# Patient Record
Sex: FEMALE | Race: BLACK OR AFRICAN AMERICAN | NOT HISPANIC OR LATINO | Employment: UNEMPLOYED | URBAN - METROPOLITAN AREA
[De-identification: names, ages, dates, MRNs, and addresses within clinical notes are randomized per-mention and may not be internally consistent; named-entity substitution may affect disease eponyms.]

---

## 2017-01-02 ENCOUNTER — ANESTHESIA EVENT (OUTPATIENT)
Dept: OBSTETRICS AND GYNECOLOGY | Facility: HOSPITAL | Age: 32
End: 2017-01-02
Payer: MEDICAID

## 2017-01-02 ENCOUNTER — HOSPITAL ENCOUNTER (INPATIENT)
Facility: HOSPITAL | Age: 32
LOS: 2 days | Discharge: HOME | End: 2017-01-04
Attending: OBSTETRICS & GYNECOLOGY | Admitting: OBSTETRICS & GYNECOLOGY
Payer: MEDICAID

## 2017-01-02 ENCOUNTER — ANESTHESIA (OUTPATIENT)
Dept: OBSTETRICS AND GYNECOLOGY | Facility: HOSPITAL | Age: 32
End: 2017-01-02
Payer: MEDICAID

## 2017-01-02 VITALS — DIASTOLIC BLOOD PRESSURE: 94 MMHG | OXYGEN SATURATION: 100 % | SYSTOLIC BLOOD PRESSURE: 110 MMHG

## 2017-01-02 DIAGNOSIS — O60.00: ICD-10-CM

## 2017-01-02 LAB
ABO + RH BLD: NORMAL
AMPHET+METHAMPHET UR QL: NEGATIVE
BARBITURATES UR QL SCN>200 NG/ML: NEGATIVE
BASOPHILS # BLD AUTO: 0.01 K/UL
BASOPHILS NFR BLD: 0.1 %
BENZODIAZ UR QL SCN>200 NG/ML: NEGATIVE
BILIRUB UR QL STRIP: NEGATIVE
BLD GP AB SCN CELLS X3 SERPL QL: NORMAL
BZE UR QL SCN: NEGATIVE
CANNABINOIDS UR QL SCN: NORMAL
CLARITY UR: CLEAR
COLOR UR: YELLOW
CREAT UR-MCNC: 35.8 MG/DL
DIFFERENTIAL METHOD: ABNORMAL
EOSINOPHIL # BLD AUTO: 0 K/UL
EOSINOPHIL NFR BLD: 0.4 %
ERYTHROCYTE [DISTWIDTH] IN BLOOD BY AUTOMATED COUNT: 15.2 %
GLUCOSE UR QL STRIP: NEGATIVE
HCT VFR BLD AUTO: 32.5 %
HGB BLD-MCNC: 10.7 G/DL
HGB S BLD QL SOLY: NEGATIVE
HGB UR QL STRIP: NEGATIVE
HIV1+2 IGG SERPL QL IA.RAPID: NEGATIVE
KETONES UR QL STRIP: ABNORMAL
LEUKOCYTE ESTERASE UR QL STRIP: NEGATIVE
LYMPHOCYTES # BLD AUTO: 2.9 K/UL
LYMPHOCYTES NFR BLD: 26.5 %
MCH RBC QN AUTO: 26.6 PG
MCHC RBC AUTO-ENTMCNC: 32.9 %
MCV RBC AUTO: 81 FL
METHADONE UR QL SCN>300 NG/ML: NEGATIVE
MONOCYTES # BLD AUTO: 0.6 K/UL
MONOCYTES NFR BLD: 5.5 %
NEUTROPHILS # BLD AUTO: 7.3 K/UL
NEUTROPHILS NFR BLD: 67.5 %
NITRITE UR QL STRIP: NEGATIVE
OPIATES UR QL SCN: NEGATIVE
PCP UR QL SCN>25 NG/ML: NEGATIVE
PH UR STRIP: 6 [PH] (ref 5–8)
PLATELET # BLD AUTO: 266 K/UL
PMV BLD AUTO: 10.4 FL
PROT UR QL STRIP: NEGATIVE
RBC # BLD AUTO: 4.02 M/UL
RPR SER QL: NORMAL
SP GR UR STRIP: <=1.005 (ref 1–1.03)
TOXICOLOGY INFORMATION: NORMAL
URN SPEC COLLECT METH UR: ABNORMAL
UROBILINOGEN UR STRIP-ACNC: NEGATIVE EU/DL
WBC # BLD AUTO: 10.86 K/UL

## 2017-01-02 PROCEDURE — 86701 HIV-1ANTIBODY: CPT

## 2017-01-02 PROCEDURE — 63600175 PHARM REV CODE 636 W HCPCS: Performed by: NURSE ANESTHETIST, CERTIFIED REGISTERED

## 2017-01-02 PROCEDURE — 87081 CULTURE SCREEN ONLY: CPT

## 2017-01-02 PROCEDURE — 87340 HEPATITIS B SURFACE AG IA: CPT

## 2017-01-02 PROCEDURE — 63600175 PHARM REV CODE 636 W HCPCS: Performed by: ADVANCED PRACTICE MIDWIFE

## 2017-01-02 PROCEDURE — 85660 RBC SICKLE CELL TEST: CPT

## 2017-01-02 PROCEDURE — 11000001 HC ACUTE MED/SURG PRIVATE ROOM

## 2017-01-02 PROCEDURE — 86901 BLOOD TYPING SEROLOGIC RH(D): CPT

## 2017-01-02 PROCEDURE — 86900 BLOOD TYPING SEROLOGIC ABO: CPT

## 2017-01-02 PROCEDURE — 99211 OFF/OP EST MAY X REQ PHY/QHP: CPT | Mod: 25

## 2017-01-02 PROCEDURE — 0HQ9XZZ REPAIR PERINEUM SKIN, EXTERNAL APPROACH: ICD-10-PCS | Performed by: ADVANCED PRACTICE MIDWIFE

## 2017-01-02 PROCEDURE — 87591 N.GONORRHOEAE DNA AMP PROB: CPT

## 2017-01-02 PROCEDURE — 86592 SYPHILIS TEST NON-TREP QUAL: CPT

## 2017-01-02 PROCEDURE — 72200004 HC VAGINAL DELIVERY LEVEL I

## 2017-01-02 PROCEDURE — 25000003 PHARM REV CODE 250: Performed by: ADVANCED PRACTICE MIDWIFE

## 2017-01-02 PROCEDURE — 62326 NJX INTERLAMINAR LMBR/SAC: CPT

## 2017-01-02 PROCEDURE — 85025 COMPLETE CBC W/AUTO DIFF WBC: CPT

## 2017-01-02 PROCEDURE — 51701 INSERT BLADDER CATHETER: CPT

## 2017-01-02 PROCEDURE — 27800517 HC TRAY,EPIDURAL-CONTINUOUS: Performed by: NURSE ANESTHETIST, CERTIFIED REGISTERED

## 2017-01-02 PROCEDURE — 72100002 HC LABOR CARE, 1ST 8 HOURS

## 2017-01-02 PROCEDURE — 82570 ASSAY OF URINE CREATININE: CPT

## 2017-01-02 PROCEDURE — 81003 URINALYSIS AUTO W/O SCOPE: CPT

## 2017-01-02 PROCEDURE — 25000003 PHARM REV CODE 250: Performed by: NURSE ANESTHETIST, CERTIFIED REGISTERED

## 2017-01-02 PROCEDURE — 86762 RUBELLA ANTIBODY: CPT

## 2017-01-02 RX ORDER — MAGNESIUM SULFATE HEPTAHYDRATE 40 MG/ML
2 INJECTION, SOLUTION INTRAVENOUS ONCE
Status: COMPLETED | OUTPATIENT
Start: 2017-01-02 | End: 2017-01-02

## 2017-01-02 RX ORDER — BETAMETHASONE SODIUM PHOSPHATE AND BETAMETHASONE ACETATE 3; 3 MG/ML; MG/ML
12 INJECTION, SUSPENSION INTRA-ARTICULAR; INTRALESIONAL; INTRAMUSCULAR; SOFT TISSUE EVERY 24 HOURS
Status: DISCONTINUED | OUTPATIENT
Start: 2017-01-03 | End: 2017-01-03

## 2017-01-02 RX ORDER — SODIUM CITRATE AND CITRIC ACID MONOHYDRATE 334; 500 MG/5ML; MG/5ML
30 SOLUTION ORAL ONCE
Status: DISCONTINUED | OUTPATIENT
Start: 2017-01-02 | End: 2017-01-03

## 2017-01-02 RX ORDER — CALCIUM GLUCONATE 98 MG/ML
1 INJECTION, SOLUTION INTRAVENOUS
Status: DISCONTINUED | OUTPATIENT
Start: 2017-01-02 | End: 2017-01-03

## 2017-01-02 RX ORDER — ROPIVACAINE HYDROCHLORIDE 2 MG/ML
INJECTION, SOLUTION EPIDURAL; INFILTRATION; PERINEURAL
Status: DISCONTINUED | OUTPATIENT
Start: 2017-01-02 | End: 2017-01-03

## 2017-01-02 RX ORDER — ROPIVACAINE IN 0.9% SOD CHL/PF 0.2 %
PLASTIC BAG, INJECTION (ML) EPIDURAL CONTINUOUS
Status: DISCONTINUED | OUTPATIENT
Start: 2017-01-02 | End: 2017-01-03

## 2017-01-02 RX ORDER — ROPIVACAINE HYDROCHLORIDE 2 MG/ML
INJECTION, SOLUTION EPIDURAL; INFILTRATION; PERINEURAL CONTINUOUS PRN
Status: DISCONTINUED | OUTPATIENT
Start: 2017-01-02 | End: 2017-01-03

## 2017-01-02 RX ORDER — FAMOTIDINE 10 MG/ML
20 INJECTION INTRAVENOUS ONCE
Status: DISCONTINUED | OUTPATIENT
Start: 2017-01-02 | End: 2017-01-03

## 2017-01-02 RX ORDER — MAGNESIUM SULFATE HEPTAHYDRATE 40 MG/ML
2 INJECTION, SOLUTION INTRAVENOUS CONTINUOUS
Status: DISCONTINUED | OUTPATIENT
Start: 2017-01-02 | End: 2017-01-03

## 2017-01-02 RX ORDER — LIDOCAINE HYDROCHLORIDE AND EPINEPHRINE 15; 5 MG/ML; UG/ML
INJECTION, SOLUTION EPIDURAL
Status: DISCONTINUED | OUTPATIENT
Start: 2017-01-02 | End: 2017-01-03

## 2017-01-02 RX ORDER — ONDANSETRON 8 MG/1
8 TABLET, ORALLY DISINTEGRATING ORAL EVERY 8 HOURS PRN
Status: DISCONTINUED | OUTPATIENT
Start: 2017-01-02 | End: 2017-01-03

## 2017-01-02 RX ORDER — METOCLOPRAMIDE HYDROCHLORIDE 5 MG/ML
10 INJECTION INTRAMUSCULAR; INTRAVENOUS ONCE
Status: DISCONTINUED | OUTPATIENT
Start: 2017-01-02 | End: 2017-01-03

## 2017-01-02 RX ORDER — ACETAMINOPHEN 500 MG
500 TABLET ORAL EVERY 6 HOURS PRN
Status: DISCONTINUED | OUTPATIENT
Start: 2017-01-02 | End: 2017-01-03

## 2017-01-02 RX ORDER — SODIUM CHLORIDE, SODIUM LACTATE, POTASSIUM CHLORIDE, CALCIUM CHLORIDE 600; 310; 30; 20 MG/100ML; MG/100ML; MG/100ML; MG/100ML
INJECTION, SOLUTION INTRAVENOUS CONTINUOUS
Status: DISCONTINUED | OUTPATIENT
Start: 2017-01-02 | End: 2017-01-03

## 2017-01-02 RX ADMIN — LIDOCAINE HYDROCHLORIDE AND EPINEPHRINE 3 ML: 15; 5 INJECTION, SOLUTION EPIDURAL; INFILTRATION; INTRACAUDAL; PERINEURAL at 08:01

## 2017-01-02 RX ADMIN — ONDANSETRON 8 MG: 8 TABLET, ORALLY DISINTEGRATING ORAL at 09:01

## 2017-01-02 RX ADMIN — ROPIVACAINE HYDROCHLORIDE 8 ML: 2 INJECTION, SOLUTION EPIDURAL; INFILTRATION; PERINEURAL at 08:01

## 2017-01-02 RX ADMIN — AMPICILLIN SODIUM 2 G: 2 INJECTION, POWDER, FOR SOLUTION INTRAMUSCULAR; INTRAVENOUS at 07:01

## 2017-01-02 RX ADMIN — SODIUM CHLORIDE, SODIUM LACTATE, POTASSIUM CHLORIDE, AND CALCIUM CHLORIDE 1000 ML: .6; .31; .03; .02 INJECTION, SOLUTION INTRAVENOUS at 07:01

## 2017-01-02 RX ADMIN — SODIUM CHLORIDE, SODIUM LACTATE, POTASSIUM CHLORIDE, AND CALCIUM CHLORIDE: .6; .31; .03; .02 INJECTION, SOLUTION INTRAVENOUS at 09:01

## 2017-01-02 RX ADMIN — ROPIVACAINE HYDROCHLORIDE 14 ML/HR: 2 INJECTION, SOLUTION EPIDURAL; INFILTRATION at 08:01

## 2017-01-02 RX ADMIN — ROPIVACAINE HYDROCHLORIDE 7 ML: 2 INJECTION, SOLUTION EPIDURAL; INFILTRATION; PERINEURAL at 08:01

## 2017-01-02 RX ADMIN — MAGNESIUM SULFATE IN WATER 2 G/HR: 40 INJECTION, SOLUTION INTRAVENOUS at 09:01

## 2017-01-02 RX ADMIN — MAGNESIUM SULFATE IN WATER 2 G: 40 INJECTION, SOLUTION INTRAVENOUS at 08:01

## 2017-01-02 RX ADMIN — Medication 333 MILLI-UNITS/MIN: at 11:01

## 2017-01-02 RX ADMIN — BETAMETHASONE SODIUM PHOSPHATE AND BETAMETHASONE ACETATE 12 MG: 3; 3 INJECTION, SUSPENSION INTRA-ARTICULAR; INTRALESIONAL; INTRAMUSCULAR at 08:01

## 2017-01-02 NOTE — IP AVS SNAPSHOT
[address] [place]nie [place] [address]           Patient Discharge Instructions     Our goal is to set you up for success. This packet includes information on your condition, medications, and your home care. It will help you to care for yourself so you don't get sicker and need to go back to the hospital.     Please ask your nurse if you have any questions.        There are many details to remember when preparing to leave the hospital. Here is what you will need to do:    1. Take your medicine. If you are prescribed medications, review your Medication List in the following pages. You may have new medications to  at the pharmacy and others that you'll need to stop taking. Review the instructions for how and when to take your medications. Talk with your doctor or nurses if you are unsure of what to do.     2. Go to your follow-up appointments. Specific follow-up information is listed in the following pages. Your may be contacted by a transition nurse or clinical provider about future appointments. Be sure we have all of the phone numbers to reach you, if needed. Please contact your provider's office if you are unable to make an appointment.     3. Watch for warning signs. Your doctor or nurse will give you detailed warning signs to watch for and when to call for assistance. These instructions may also include educational information about your condition. If you experience any of warning signs to your health, call your doctor.               ** Verify the list of medication(s) below is accurate and up to date. Carry this with you in case of emergency. If your medications have changed, please notify your healthcare provider.             Medication List      START taking these medications        Additional Info                      ibuprofen 600 MG tablet   Commonly known as:  ADVIL,MOTRIN   Quantity:  30 tablet   Refills:  0   Dose:  600 mg    Last time this was given:  600  mg on 1/4/2017 12:04 PM   Instructions:  Take 1 tablet (600 mg total) by mouth every 6 (six) hours.     Begin Date    AM    Noon    PM    Bedtime         CONTINUE taking these medications        Additional Info                      diclofenac 50 MG EC tablet   Commonly known as:  VOLTAREN   Quantity:  15 tablet   Refills:  0   Dose:  50 mg    Instructions:  Take 1 tablet (50 mg total) by mouth 3 (three) times daily as needed (PAIN).     Begin Date    AM    Noon    PM    Bedtime         STOP taking these medications     cyclobenzaprine 10 MG tablet   Commonly known as:  FLEXERIL            Where to Get Your Medications      You can get these medications from any pharmacy     Bring a paper prescription for each of these medications     ibuprofen 600 MG tablet                  Please bring to all follow up appointments:    1. A copy of your discharge instructions.  2. All medicines you are currently taking in their original bottles.  3. Identification and insurance card.    Please arrive 15 minutes ahead of scheduled appointment time.    Please call 24 hours in advance if you must reschedule your appointment and/or time.        Your Scheduled Appointments     Jan 19, 2017 10:00 AM CST   Post Partum with Will Pahceco MD   [facility] ([facility]    [address] [place] [id number]   [phone]            Feb 01, 2017  1:30 PM CST   Post Partum with Laura Ramirez CNM   [facility] - [facility] ([facility])    [address] [place] [id number]   [phone]              Follow-up Information     Follow up with Laura Ramirez CNM. Go on 2/1/2017.    Specialty:  Obstetrics and Gynecology    Why:  postpartum follow up    Contact information:    [address]  [place] [address]  [phone]          Follow up with Will Pacheco MD. Go on 1/19/2017.    Specialties:  Obstetrics, Obstetrics and Gynecology    Why:  tubal consult    Contact information:    [address]  [place]  "[id number] [phone]            Discharge Instructions       Mother Self Care:    Activity: Avoid strenuous exercise and get adequate rest.  No driving until the physician consent given.  Emotional Changes: Most women find birth to be a time of great emotional upheaval.  Sense of loss, mood swings, fatigue, anxiety, and feeling "let down" are common.  If feelings worsen or last more than a week, call your physician.  Breast Care/Breastfeeding: Wear a bra for comfort.  Keep nipples dry and apply your own breast milk or lanolin cream as needed for soreness.  Engorgement can be relieved with warm, moist heat before feedings.  You may also take Ibuprofen.  Breast Care/Bottle Feeding: Wear support bra 24 hours a day for one week.  Avoid stimulation to breasts.  You may use ice packs for discomfort.  Mariam-Care/Vaginal Bleeding: Remember to use your mariam-bottle after urinating.  Your flow will change from red, to pink, to yellow/white color over a period of 2 weeks.  Menstruation will return in 3-8 weeks, or longer if breastfeeding.  Episiotomy Vaginal Delivery: Stitches will dissolve within 10 days to 3 weeks.  Warm baths, tucks, and dermoplast will promote healing.  Avoid bubble baths or strong soaps.   Section/Tubal Ligation: Keep incision clean and dry.  Please remove steri-strips in 5-7 days.  You may shower, but avoid baths.  Sexual Activity/Pelvic Rest: No sexual activity, tampons, or douching until your physician gives you consent.  Diet: Continue to eat from the five basic food groups, including plenty of protein, fruits, vegetables, and whole grains.  Limit empty calories and high fat foods.  Drink enough fluids to satisfy thirst and add an extra 500 calories for breastfeeding.  Constipation/Hemorrhoids: Drink plenty of water.  You may take a stool softener or natural laxative (Metamucil). You may use tucks or hemorrhoid ointment and soak in a warm tub.    CALL YOUR OB DOCTOR IF ANY OF THE FOLLOWING " OCCURS:  *Heavy bleeding - saturating a pad an hour or passing any large (2-3 inches in size) blood clots.  *Any pain, redness, or tenderness in lower leg.  *You cannot care for yourself or your baby.  *Any signs of infection-      - Temperature greater than 100.5 degrees F      - Foul smelling vaginal discharge and/or incisional drainage      - Increased episiotomy or incisional pain      - Hot, hard, red or sore area on breast      - Flu-like symptoms      - Any urgency, frequency or burning with urination    Discharge References/Attachments     VAGINAL BIRTH, INCISION CARE AFTER (ENGLISH)    AFTER DELIVERY: WHEN TO CALL THE HEALTH CARE PROVIDER (ENGLISH)        Primary Diagnosis     Your primary diagnosis was:   Labor      Admission Information     Date & Time Provider Department CSN    2017  5:26 PM Parth Foster MD [facility] [id number]      Care Providers     Provider Role Specialty Primary office phone    Parth Foster MD Attending Provider Obstetrics and Gynecology [phone]      Your Vitals Were     BP Pulse Temp Resp Last Period SpO2    122/76 56 98.6 °F (37 °C) (Oral) 18 2016 94%      Recent Lab Values     No lab values to display.      Pending Labs     Order Current Status    Rubella antibody, IgM In process    Strep B Screen, Vaginal / Rectal Preliminary result      Allergies as of 2017     No Known Allergies      [facility]     [facility] Nurse Care Line - 24/ Assistance  Unless otherwise directed by your provider, please contact [facility], our nurse care line that is available for / assistance.     Registered nurses in the [facility] provide clinical advisement, health education, appointment booking, and other advisory services.  Call for this free service at [phone].        Advance Directives     An advance directive is a document which, in the event you are no longer able to make decisions for yourself, tells your  healthcare team what kind of treatment you do or do not want to receive, or who you would like to make those decisions for you.  If you do not currently have an advance directive, [facility] encourages you to create one.  For more information call:  [phone]), [phone] ([phone]),  or log on to [email or web address].        Smoking Cessation     If you would like to quit smoking:   You may be eligible for free services if you are a [place] resident and started smoking cigarettes before September 1, 1988.  Call the Smoking Cessation Trust (SCT) toll free at (765) 296-3074 or (492) 473-3667.   Call 3-064-QUIT-NOW if you do not meet the above criteria.            Language Assistance Services     ATTENTION: Language assistance services are available, free of charge. Please call [phone].      ATENCIÓN: Si habla español, tiene a pike disposición servicios gratuitos de asistencia lingüística. Llame al [phone].     [facility] Ý: N?u b?n nói Ti?ng Vi?t, có các d?ch v? h? tr? ngôn ng? mi?n phí dành cho b?n. G?i s? [phone].        [facility] Sign-Up     Activating your [facility] account is as easy as 1-2-3!     1) Visit [email or web address], select Sign Up Now, enter this activation code and your date of birth, then select Next.  [email or web address]  Expires: 2/18/2017  5:58 PM      2) Create a username and password to use when you visit [facility] in the future and select a security question in case you lose your password and select Next.    3) Enter your e-mail address and click Sign Up!    Additional Information  If you have questions, please e-mail [email or web address] or call [phone] to talk to our [facility] staff. Remember, [facility] is NOT to be used for urgent needs. For medical emergencies, dial 911.          [facility]  complies with applicable Federal civil rights laws and does not discriminate on the basis of race, color, national origin, age, disability, or  sex.

## 2017-01-02 NOTE — IP AVS SNAPSHOT
[facility]  [id number] [place] [address] Dr [place] [address]           I have received a copy of my After Visit Summary and discharge instructions from [facility].    INSTRUCTIONS RECEIVED AND UNDERSTOOD BY:                     Patient/Patient Representative: ________________________________________________________________     Date/Time: ________________________________________________________________                     Instructions Given By: ________________________________________________________________     Date/Time: ________________________________________________________________

## 2017-01-03 PROBLEM — O60.00 PREMATURE LABOR AFTER 22 WEEKS, BUT BEFORE 37 COMPLETED WEEKS OF GESTATION WITHOUT DELIVERY: Status: RESOLVED | Noted: 2017-01-02 | Resolved: 2017-01-03

## 2017-01-03 PROCEDURE — 51701 INSERT BLADDER CATHETER: CPT

## 2017-01-03 PROCEDURE — 72100002 HC LABOR CARE, 1ST 8 HOURS

## 2017-01-03 PROCEDURE — 25000003 PHARM REV CODE 250: Performed by: MIDWIFE

## 2017-01-03 PROCEDURE — 11000001 HC ACUTE MED/SURG PRIVATE ROOM

## 2017-01-03 PROCEDURE — 72200004 HC VAGINAL DELIVERY LEVEL I

## 2017-01-03 PROCEDURE — 25000003 PHARM REV CODE 250: Performed by: ADVANCED PRACTICE MIDWIFE

## 2017-01-03 PROCEDURE — 63600175 PHARM REV CODE 636 W HCPCS: Performed by: ADVANCED PRACTICE MIDWIFE

## 2017-01-03 PROCEDURE — 59409 OBSTETRICAL CARE: CPT | Mod: AT,,, | Performed by: ADVANCED PRACTICE MIDWIFE

## 2017-01-03 PROCEDURE — 99211 OFF/OP EST MAY X REQ PHY/QHP: CPT

## 2017-01-03 RX ORDER — DIPHENHYDRAMINE HYDROCHLORIDE 50 MG/ML
25 INJECTION INTRAMUSCULAR; INTRAVENOUS EVERY 4 HOURS PRN
Status: DISCONTINUED | OUTPATIENT
Start: 2017-01-03 | End: 2017-01-04 | Stop reason: HOSPADM

## 2017-01-03 RX ORDER — OXYTOCIN/RINGER'S LACTATE 20/1000 ML
41.65 PLASTIC BAG, INJECTION (ML) INTRAVENOUS CONTINUOUS
Status: ACTIVE | OUTPATIENT
Start: 2017-01-03 | End: 2017-01-03

## 2017-01-03 RX ORDER — IBUPROFEN 600 MG/1
600 TABLET ORAL EVERY 6 HOURS
Status: DISCONTINUED | OUTPATIENT
Start: 2017-01-03 | End: 2017-01-04 | Stop reason: HOSPADM

## 2017-01-03 RX ORDER — ACETAMINOPHEN 325 MG/1
650 TABLET ORAL EVERY 6 HOURS PRN
Status: DISCONTINUED | OUTPATIENT
Start: 2017-01-03 | End: 2017-01-04 | Stop reason: HOSPADM

## 2017-01-03 RX ORDER — ONDANSETRON 8 MG/1
8 TABLET, ORALLY DISINTEGRATING ORAL EVERY 8 HOURS PRN
Status: DISCONTINUED | OUTPATIENT
Start: 2017-01-03 | End: 2017-01-04 | Stop reason: HOSPADM

## 2017-01-03 RX ORDER — MAG HYDROX/ALUMINUM HYD/SIMETH 200-200-20
30 SUSPENSION, ORAL (FINAL DOSE FORM) ORAL EVERY 6 HOURS PRN
Status: DISCONTINUED | OUTPATIENT
Start: 2017-01-03 | End: 2017-01-04 | Stop reason: HOSPADM

## 2017-01-03 RX ORDER — OXYCODONE AND ACETAMINOPHEN 10; 325 MG/1; MG/1
1 TABLET ORAL ONCE
Status: COMPLETED | OUTPATIENT
Start: 2017-01-03 | End: 2017-01-03

## 2017-01-03 RX ORDER — OXYCODONE AND ACETAMINOPHEN 5; 325 MG/1; MG/1
1 TABLET ORAL EVERY 4 HOURS PRN
Status: DISCONTINUED | OUTPATIENT
Start: 2017-01-03 | End: 2017-01-04 | Stop reason: HOSPADM

## 2017-01-03 RX ORDER — DIPHENHYDRAMINE HCL 25 MG
25 CAPSULE ORAL EVERY 4 HOURS PRN
Status: DISCONTINUED | OUTPATIENT
Start: 2017-01-03 | End: 2017-01-04 | Stop reason: HOSPADM

## 2017-01-03 RX ORDER — DOCUSATE SODIUM 100 MG/1
200 CAPSULE, LIQUID FILLED ORAL 2 TIMES DAILY PRN
Status: DISCONTINUED | OUTPATIENT
Start: 2017-01-03 | End: 2017-01-04 | Stop reason: HOSPADM

## 2017-01-03 RX ORDER — OXYCODONE AND ACETAMINOPHEN 10; 325 MG/1; MG/1
1 TABLET ORAL EVERY 4 HOURS PRN
Status: DISCONTINUED | OUTPATIENT
Start: 2017-01-03 | End: 2017-01-03

## 2017-01-03 RX ORDER — OXYCODONE AND ACETAMINOPHEN 10; 325 MG/1; MG/1
1 TABLET ORAL EVERY 4 HOURS PRN
Status: DISCONTINUED | OUTPATIENT
Start: 2017-01-03 | End: 2017-01-04 | Stop reason: HOSPADM

## 2017-01-03 RX ADMIN — IBUPROFEN 600 MG: 600 TABLET, FILM COATED ORAL at 06:01

## 2017-01-03 RX ADMIN — IBUPROFEN 600 MG: 600 TABLET, FILM COATED ORAL at 12:01

## 2017-01-03 RX ADMIN — OXYCODONE HYDROCHLORIDE AND ACETAMINOPHEN 1 TABLET: 10; 325 TABLET ORAL at 06:01

## 2017-01-03 RX ADMIN — OXYCODONE HYDROCHLORIDE AND ACETAMINOPHEN 1 TABLET: 10; 325 TABLET ORAL at 02:01

## 2017-01-03 RX ADMIN — OXYCODONE HYDROCHLORIDE AND ACETAMINOPHEN 1 TABLET: 10; 325 TABLET ORAL at 08:01

## 2017-01-03 RX ADMIN — ALUMINUM HYDROXIDE, MAGNESIUM HYDROXIDE, AND SIMETHICONE 30 ML: 200; 200; 20 SUSPENSION ORAL at 08:01

## 2017-01-03 RX ADMIN — OXYCODONE HYDROCHLORIDE AND ACETAMINOPHEN 1 TABLET: 10; 325 TABLET ORAL at 01:01

## 2017-01-03 RX ADMIN — OXYCODONE HYDROCHLORIDE AND ACETAMINOPHEN 1 TABLET: 10; 325 TABLET ORAL at 10:01

## 2017-01-03 NOTE — H&P
[facility]  History & Physical  Obstetrics  2000hrs    SUBJECTIVE:     Chief Complaint/Reason for Admission: Isaiah, stating no PN care ans about 39 weeks    History of Present Illness:  Tyson Ricks is a 31 y.o.  female with an Estimated Date of Delivery: None noted. admitted for labor management.  Her current obstetrical history is significant for  delivery, No PN care.  She reports contractions since this aftermnoon. Fetal Movement: normal.    PTA Medications   Medication Sig    cyclobenzaprine (FLEXERIL) 10 MG tablet Take 1 tablet (10 mg total) by mouth 3 (three) times daily as needed for Muscle spasms (Muscle soreness).    diclofenac (VOLTAREN) 50 MG EC tablet Take 1 tablet (50 mg total) by mouth 3 (three) times daily as needed (PAIN).       Review of patient's allergies indicates:  No Known Allergies     Past Medical History   Diagnosis Date    Anemia      Past Surgical History   Procedure Laterality Date    Tonsillectomy, adenoidectomy       History reviewed. No pertinent family history.  Social History   Substance Use Topics    Smoking status: Current Every Day Smoker     Packs/day: 0.50    Smokeless tobacco: Never Used    Alcohol use No       Review of Systems  Constitutional: no fever or chills  Respiratory: no cough or shortness of breath  Cardiovascular: no chest pain or palpitations  Gastrointestinal: no nausea or vomiting  Genitourinary: no hematuria or dysuria  Musculoskeletal: no arthralgias or myalgias     OBJECTIVE:     Vital Signs (Most Recent):       Physical Exam:  General:  alert, normal appearing gravid female, cooperative and moderate distress   Skin:  Skin color, texture, turgor normal. No rashes or lesions       Lungs:  clear to auscultation bilaterally   Heart:  regular rate and rhythm       Abdomen:  normal findings: soft, non-tender   Uterine Size:  34 week size   Presentations:  cephalic per US   FHT:  150 BPM reassuring   Pelvis: Exam  deferred.  GC/CMZ and GBS obtained   Cervix: Per RN    Dilation: /-1    Effacement:     Station:      Consistency:     Position:      Laboratory:  Lab Results   Component Value Date    GROUPTRH B POS 2017    HEPBSAG Negative 2013        Diagnostic Results:  No PN care- orders placed   Verbal report given 30-32 weeks with vtx presentation    ASSESSMENT/PLAN:     31 yr old  with IUP approx 30-32 weeks per US today  Cat 1strip  No PN care  Premature SROM at  Advanced dilation  Hx twins -    PLAN  D/W Dr Foster    Admit per protocol  Initiate  RX- BMZ, MgSO4 and ABX  May have epidural  PN lab  GC/CMZ and GBS swabs  Inform NICU       Conditions: No PN care   Risks, benefits, alternatives and possible complications have been discussed in detail with the patient.

## 2017-01-03 NOTE — NURSING
"Discussed feeding choice with mother.  Reviewed benefits of breastfeeding.  Patient given "What to Expect in the First 48 Hours" handout. Mother states her intention is formula feed  "

## 2017-01-03 NOTE — ANESTHESIA PROCEDURE NOTES
Epidural    Patient location during procedure: OB   Start time: 1/2/2017 8:20 PM  Timeout: 1/2/2017 8:19 PM  End time: 1/2/2017 8:28 PM  Staffing  Anesthesiologist: JENNIFER MANZO  Performed by: anesthesiologist   Preanesthetic Checklist  Completed: patient identified, surgical consent, pre-op evaluation, timeout performed, IV checked, risks and benefits discussed, monitors and equipment checked, anesthesia consent given, hand hygiene performed and patient being monitored  Preparation  Patient position: sitting  Prep: Betadine  Patient monitoring: Pulse Ox and Blood Pressure  Epidural  Skin Anesthetic: lidocaine 1%  Skin Wheal: 3 mL  Administration type: continuous  Approach: midline  Interspace: L4-5  Injection technique: FLOWER air  Needle and Epidural Catheter  Needle type: Tuohy   Needle gauge: 18  Needle length: 3.5 inches  Needle insertion depth: 10 cm  Catheter type: multi-orifice  Catheter size: 20 G  Catheter at skin depth: 15 cm  Test dose: 3 mL of lidocaine 1.5% with Epi 1-to-200,000  Additional Documentation: incremental injection, negative aspiration for heme and CSF, no signs/symptoms of IV or SA injection, no paresthesia on injection, no significant pain on injection and no significant complaints from patient  Needle localization: anatomical landmarks  Medications:  Bolus administered: 15 mL of 0.2% ropivacaine  Epinephrine added: none  Volume per aspiration: 3 mL  Time between aspirations: 0.1 minutes  Assessment  Upper dermatomal levels - Left: T11  Right: T11  Lower dermatomal level: N/A   Dermatomal levels determined by alcohol wipe  Ease of block: easy  Patient's tolerance of the procedure: comfortable throughout block and no complaints

## 2017-01-03 NOTE — PLAN OF CARE
Problem: Patient Care Overview  Goal: Plan of Care Review  Outcome: Ongoing (interventions implemented as appropriate)  Patient had a preciptous delivery. Bonding with infant appropriately. VSS. Patient is formula feeding. Patient has ambulated and voided x3. IV removed. Bleeding has been scant. Pain is controlled with percocet.

## 2017-01-03 NOTE — PLAN OF CARE
Problem: Patient Care Overview  Goal: Plan of Care Review  Outcome: Ongoing (interventions implemented as appropriate)  Discussed plan of care with pt, pain management, medications available. Resting in bed with significant other at bedside, VSS, ambulating and voiding. Pt states continues to have after birth cramps with pain medications.

## 2017-01-03 NOTE — ANESTHESIA POSTPROCEDURE EVALUATION
Anesthesia Post Evaluation    Patient: Tyson Ricks    Procedure(s) Performed: * No procedures listed *    Final Anesthesia Type: epidural  Patient location during evaluation: labor & delivery  Patient participation: Yes- Able to Participate  Level of consciousness: awake and alert and oriented  Post-procedure vital signs: reviewed and stable  Pain management: adequate  Airway patency: patent  PONV status at discharge: No PONV  Anesthetic complications: no      Cardiovascular status: hemodynamically stable  Respiratory status: unassisted, spontaneous ventilation and room air  Hydration status: euvolemic  Follow-up not needed.        Visit Vitals    /71    Pulse 64    LMP 04/27/2016    SpO2 (!) 94%    Breastfeeding Unknown       Pain/Karl Score: Pain Rating Prior to Med Admin: 7 (1/3/2017  1:11 AM)  Karl Score: 9 (1/2/2017 11:45 PM)

## 2017-01-03 NOTE — PROGRESS NOTES
[facility]  Vaginal Delivery Progress Note  Obstetrics    SUBJECTIVE:     Tyson Ricks is a 31 y.o. female PPD #1 status post Spontaneous vaginal delivery at 32w5d in a pregnancy complicated by no prenatal care, hx of twin delivery. Patient is doing well this morning. She denies nausea, vomiting, fever or chills. Patient reports mild abdominal pain that is adequately relieved by oral pain medications. Lochia is moderate and decreasing. Patient is voiding without difficulty and ambulating with no difficulty. She has passed flatus and has not had a BM. Patient does not plan to breast feed.   OBJECTIVE:     Vital Signs Ranges:  Temp:  [98 °F (36.7 °C)-98.1 °F (36.7 °C)] 98 °F (36.7 °C)  Pulse:  [] 64  Resp:  [18] 18  BP: ()/(28-97) 127/71    I/O (Last 24H):    Intake/Output Summary (Last 24 hours) at 01/03/17 0838  Last data filed at 01/03/17 0430   Gross per 24 hour   Intake           201.67 ml   Output             1550 ml   Net         -1348.33 ml       Physical Exam:  General:    alert, appears stated age, cooperative and no distress   Abdomen:  normal findings: soft, non-tender   Uterine Size:  firm located at the umbilicus.   Extremities:   peripheral pulses normal, no pedal edema, no clubbing or cyanosis         ASSESSMENT:     Assessment:  Active Hospital Problems    Diagnosis    *Labor, premature, with delivery, baby delivered      PLAN:     Plan:  1. Postpartum care:   - Patient doing well. Continue routine management and advances.   - Continue PO pain meds. Pain well controlled.   - Encourage ambulation   - Circumcision did not ask   - Contraception unknown   - Lactation formula feeding    2. CPM    Disposition: As patient meets milestones, will plan to discharge tomorrow.

## 2017-01-03 NOTE — L&D DELIVERY NOTE
Delivery Information for  Hank Ricks    Birth information:  YOB: 2018   Time of birth: 10:56 PM   Sex: male   Head Delivery Date/Time: 2017 10:56 PM   Delivery type: Vaginal, Spontaneous Delivery   Gestational Age: 32w5d    Delivery Providers    Delivering clinician:  ASHLEY NARAYAN   Other personnel:   Provider Role   BEULAH GARDNER Registered Nurse                   Measurements    Weight:  2625 g Length:  48.5 cm   Head circum.:  33 cm Chest circum.:  30 cm   Abdominal girth:  27.5 cm          [place] Assessment    Living status:  Yes   Apgars    1 Minute:   5 Minute:   10 Minute 15 Minute 20 Minute   Skin Color: 1  1       Heart Rate: 2  2       Reflex Irritability: 2  2       Muscle Tone: 2  2       Respiratory Effort: 2  2       Total: 9  9                  Apgars Assigned By:  VIN SWEENEY RN          Assisted Delivery Details:    Forceps attempted?:  No   Vacuum extractor attempted?:  No             Shoulder Dystocia    Shoulder dystocia present?:  No                                             Presentation and Position    Presentation: Vertex   Position:     Occiput               Interventions/Resuscitation    Method:  Bulb Suctioning, Tactile Stimulation, Deep Suctioning, NICU Attended        Cord    Vessels:  3 vessels   Complications:  None   Delayed Cord Clamping?:  Yes   Cord Clamped Date/Time:  2017 11:02 PM   Cord Blood Disposition:  Lab   Gases Sent?:  No   Stem Cell Collection (by MD):  No        Placenta    Date and time:  2017 11:10 PM   Removal:  Spontaneous   Appearance:  Intact   Placenta disposition:  Discarded            Labor Events:       labor: Yes     Labor Onset Date/Time:         Dilation Complete Date/Time:         Start Pushing Date/Time:       Rupture Date/Time:              Rupture type:           Fluid Amount:        Fluid Color:        Fluid Odor:        Membrane Status (PeriCalm): SRM (Spontaneous Rupture)      Rupture Date/Time  (PeriCalm): 2017 19:20:00      Fluid Amount (PeriCalm): Small      Fluid Color (PeriCalm): Clear       steroids: Partial Course     Antibiotics given for GBS: Yes     Induction: none     Indications for induction:        Augmentation:       Indications for augmentation:       Labor complications:       Additional complications: Precipitous Delivery        Cervical ripening:                     Delivery:      Episiotomy: None     Indication for Episiotomy:       Perineal Lacerations: 1st Repaired:  Yes   Periurethral Laceration: none Repaired:     Labial Laceration: none Repaired:     Sulcus Laceration: none Repaired:     Vaginal Laceration: Yes Repaired: Yes   Cervical Laceration: No Repaired:     Repair suture:       Repair # of packets: 1     Blood loss (ml): 150     Vaginal Sweep Performed: Yes     Surgicount Correct:         Other providers:       Anesthesia    Method:  Epidural              Details (if applicable):  Trial of Labor      Categorization:      Priority:     Indications for :     Incision Type:       Additional  information:  Forceps:    Vacuum:    Breech:    Observed anomalies    Other (Comments):         Called for delivery, evidently, pt vomited and baby delivered in bed, Crying, Nicu team called secondary to possible prematurity, appears SGA as opposed to   Cord clamped x2 and cut once stopped pulsating  Placenta and membranes delivered intact with CCT  First degree perineal laceration sutured with Vicryl. EBL-100ccs  Baby- Apgars9+9, Wt 5.13ozs  Mother and baby stable. Skin to skin. Plans to bottle feed  Fundus firm and well contracted

## 2017-01-03 NOTE — ANESTHESIA PREPROCEDURE EVALUATION
01/02/2017  Tyson Ricks is a 31 y.o., female.    [facility] Anesthesia Evaluation    I have reviewed the Patient Summary Reports.    I have reviewed the Nursing Notes.   I have reviewed the Medications.     Review of Systems  Anesthesia Hx:  No problems with previous Anesthesia    Social:  No Alcohol Use 1/2 pack per day.  Marijuana use today   Hematology/Oncology:  Hematology Normal   Oncology Normal     EENT/Dental:EENT/Dental Normal   Cardiovascular:  Cardiovascular Normal     Pulmonary:  Pulmonary Normal    Renal/:  Renal/ Normal     Hepatic/GI:  Hepatic/GI Normal    Musculoskeletal:  Musculoskeletal Normal    Neurological:  Neurology Normal    Endocrine:  Endocrine Normal    Dermatological:  Skin Normal    Psych:  Psychiatric Normal           Physical Exam  General:  Well nourished    Airway/Jaw/Neck:  Airway Findings: Mouth Opening: Normal Tongue: Normal  General Airway Assessment: Adult  Mallampati: II  TM Distance: Normal, at least 6 cm  Jaw/Neck Findings:  Neck ROM: Normal ROM      Dental:  Dental Findings: Tongue and lip piercing   Chest/Lungs:  Chest/Lungs Findings: Clear to auscultation, Normal Respiratory Rate     Heart/Vascular:  Heart Findings: Rate: Normal  Rhythm: Regular Rhythm  Sounds: Normal        Mental Status:  Mental Status Findings:  Cooperative, Alert and Oriented         Anesthesia Plan  Type of Anesthesia, risks & benefits discussed:  Anesthesia Type:  epidural, general  Patient's Preference:   Intra-op Monitoring Plan:   Intra-op Monitoring Plan Comments:   Post Op Pain Control Plan:   Post Op Pain Control Plan Comments:   Induction:   IV  Beta Blocker:  Patient is not currently on a Beta-Blocker (No further documentation required).       Informed Consent: Patient understands risks and agrees with Anesthesia plan.  Questions answered. Anesthesia consent signed with patient.  ASA  Score: 2     Day of Surgery Review of History & Physical: I have interviewed and examined the patient. I have reviewed the patient's H&P dated: 01/02/2017. There are no significant changes.          Ready For Surgery From Anesthesia Perspective.

## 2017-01-03 NOTE — PROGRESS NOTES
Attempted to educated mother of breastfeeding. She stated she is noted interested at this time and that she has formula feed her other children. Informed mother to let staff know if she changes her mind with mother verbalizing understanding.

## 2017-01-04 VITALS
TEMPERATURE: 98 F | SYSTOLIC BLOOD PRESSURE: 121 MMHG | RESPIRATION RATE: 18 BRPM | DIASTOLIC BLOOD PRESSURE: 73 MMHG | HEART RATE: 59 BPM | OXYGEN SATURATION: 94 %

## 2017-01-04 PROBLEM — O09.33 NO PRENATAL CARE IN CURRENT PREGNANCY IN THIRD TRIMESTER: Status: ACTIVE | Noted: 2017-01-04

## 2017-01-04 LAB
C TRACH DNA SPEC QL NAA+PROBE: NEGATIVE
HBV SURFACE AG SERPL QL IA: NEGATIVE
N GONORRHOEA DNA SPEC QL NAA+PROBE: NEGATIVE

## 2017-01-04 PROCEDURE — 90471 IMMUNIZATION ADMIN: CPT | Performed by: ADVANCED PRACTICE MIDWIFE

## 2017-01-04 PROCEDURE — 63600175 PHARM REV CODE 636 W HCPCS: Performed by: ADVANCED PRACTICE MIDWIFE

## 2017-01-04 PROCEDURE — 25000003 PHARM REV CODE 250: Performed by: ADVANCED PRACTICE MIDWIFE

## 2017-01-04 PROCEDURE — 90686 IIV4 VACC NO PRSV 0.5 ML IM: CPT | Performed by: ADVANCED PRACTICE MIDWIFE

## 2017-01-04 PROCEDURE — 90715 TDAP VACCINE 7 YRS/> IM: CPT | Performed by: ADVANCED PRACTICE MIDWIFE

## 2017-01-04 PROCEDURE — 90472 IMMUNIZATION ADMIN EACH ADD: CPT | Performed by: ADVANCED PRACTICE MIDWIFE

## 2017-01-04 PROCEDURE — 63600175 PHARM REV CODE 636 W HCPCS: Performed by: OBSTETRICS & GYNECOLOGY

## 2017-01-04 PROCEDURE — 25000003 PHARM REV CODE 250: Performed by: MIDWIFE

## 2017-01-04 PROCEDURE — 90670 PCV13 VACCINE IM: CPT | Performed by: ADVANCED PRACTICE MIDWIFE

## 2017-01-04 RX ORDER — MEDROXYPROGESTERONE ACETATE 150 MG/ML
150 INJECTION, SUSPENSION INTRAMUSCULAR
Status: DISCONTINUED | OUTPATIENT
Start: 2017-01-04 | End: 2017-01-04

## 2017-01-04 RX ORDER — IBUPROFEN 600 MG/1
600 TABLET ORAL EVERY 6 HOURS
Qty: 30 TABLET | Refills: 0 | Status: SHIPPED | OUTPATIENT
Start: 2017-01-04 | End: 2017-03-20 | Stop reason: CLARIF

## 2017-01-04 RX ORDER — IBUPROFEN 600 MG/1
600 TABLET ORAL EVERY 6 HOURS
Qty: 30 TABLET | Refills: 0 | Status: SHIPPED | OUTPATIENT
Start: 2017-01-04 | End: 2017-01-04

## 2017-01-04 RX ORDER — MEDROXYPROGESTERONE ACETATE 150 MG/ML
150 INJECTION, SUSPENSION INTRAMUSCULAR
Status: DISCONTINUED | OUTPATIENT
Start: 2017-01-04 | End: 2017-01-04 | Stop reason: HOSPADM

## 2017-01-04 RX ADMIN — PNEUMOCOCCAL 13-VALENT CONJUGATE VACCINE 0.5 ML: 2.2; 2.2; 2.2; 2.2; 2.2; 4.4; 2.2; 2.2; 2.2; 2.2; 2.2; 2.2; 2.2 INJECTION, SUSPENSION INTRAMUSCULAR at 05:01

## 2017-01-04 RX ADMIN — OXYCODONE HYDROCHLORIDE AND ACETAMINOPHEN 1 TABLET: 10; 325 TABLET ORAL at 02:01

## 2017-01-04 RX ADMIN — ALUMINUM HYDROXIDE, MAGNESIUM HYDROXIDE, AND SIMETHICONE 30 ML: 200; 200; 20 SUSPENSION ORAL at 05:01

## 2017-01-04 RX ADMIN — OXYCODONE HYDROCHLORIDE AND ACETAMINOPHEN 1 TABLET: 10; 325 TABLET ORAL at 09:01

## 2017-01-04 RX ADMIN — IBUPROFEN 600 MG: 600 TABLET, FILM COATED ORAL at 12:01

## 2017-01-04 RX ADMIN — CLOSTRIDIUM TETANI TOXOID ANTIGEN (FORMALDEHYDE INACTIVATED), CORYNEBACTERIUM DIPHTHERIAE TOXOID ANTIGEN (FORMALDEHYDE INACTIVATED), BORDETELLA PERTUSSIS TOXOID ANTIGEN (GLUTARALDEHYDE INACTIVATED), BORDETELLA PERTUSSIS FILAMENTOUS HEMAGGLUTININ ANTIGEN (FORMALDEHYDE INACTIVATED), BORDETELLA PERTUSSIS PERTACTIN ANTIGEN, AND BORDETELLA PERTUSSIS FIMBRIAE 2/3 ANTIGEN 0.5 ML: 5; 2; 2.5; 5; 3; 5 INJECTION, SUSPENSION INTRAMUSCULAR at 05:01

## 2017-01-04 RX ADMIN — IBUPROFEN 600 MG: 600 TABLET, FILM COATED ORAL at 06:01

## 2017-01-04 RX ADMIN — IBUPROFEN 600 MG: 600 TABLET, FILM COATED ORAL at 05:01

## 2017-01-04 RX ADMIN — OXYCODONE HYDROCHLORIDE AND ACETAMINOPHEN 1 TABLET: 10; 325 TABLET ORAL at 06:01

## 2017-01-04 RX ADMIN — OXYCODONE HYDROCHLORIDE AND ACETAMINOPHEN 1 TABLET: 10; 325 TABLET ORAL at 12:01

## 2017-01-04 RX ADMIN — INFLUENZA A VIRUS A/CALIFORNIA/7/2009 X-179A (H1N1) ANTIGEN (FORMALDEHYDE INACTIVATED), INFLUENZA A VIRUS A/HONG KONG/4801/2014 X-263B (H3N2) ANTIGEN (FORMALDEHYDE INACTIVATED), INFLUENZA B VIRUS B/PHUKET/3073/2013 ANTIGEN (FORMALDEHYDE INACTIVATED), AND INFLUENZA B VIRUS B/BRISBANE/60/2008 ANTIGEN (FORMALDEHYDE INACTIVATED) 0.5 ML: 15; 15; 15; 15 INJECTION, SUSPENSION INTRAMUSCULAR at 05:01

## 2017-01-04 RX ADMIN — OXYCODONE HYDROCHLORIDE AND ACETAMINOPHEN 1 TABLET: 10; 325 TABLET ORAL at 05:01

## 2017-01-04 RX ADMIN — MEDROXYPROGESTERONE ACETATE 150 MG: 150 INJECTION, SUSPENSION INTRAMUSCULAR at 05:01

## 2017-01-04 RX ADMIN — DOCUSATE SODIUM 200 MG: 100 CAPSULE, LIQUID FILLED ORAL at 08:01

## 2017-01-04 NOTE — CONSULTS
Met with pt to discuss +UDS. Baby's UDS also +THC. Pt gave birth to twins here under same circumstances in . Explained mandated reporting of drug affected newborns. Pt stated she has had cases with DCFS before and understands the process. Confirmed pt's address and phone number. Pt has 3 boys and twin girls at home. Pt's children go to [facility]. Pt has WIC for her other children and plans to sign  up as well. Pt states no other needs at this time.   DCFS report made and written f/u faxed to DCFS in [facility] (intake #[id number]).

## 2017-01-04 NOTE — DISCHARGE SUMMARY
[facility]  Delivery Discharge Summary  Obstetrics    Admit Date: 2017    Discharge Date and Time: 2017    Attending Physician: Parth Foster MD     Discharge Provider: Maite Juarez    Reason for Admission: Labor    Estimated Date of Delivery: 17     Conditions: no prenatal care    Procedures Performed: * No surgery found *    Tyson Ricks is a 31 y.o. now , PPD #2 who was admitted on 2017  5:26 PM for normal spontaneous vaginal delivery. Labor course was adequate.  Patient delivered a single viable  male. Pt was in stable condition post-delivery and was transferred to the Mother-Baby Unit. Her postpartum course was uncomplicated. On discharge day, patient's pain is controlled with oral pain medications. Patient is tolerating PO without nausea or vomiting, ambulating, voiding. She denies SOB and CP. Denies any HA, vision changes, F/C, LE swelling. Denies any breast pain/soreness.     Delivery:    Episiotomy: None   Lacerations: 1st   Repair suture:     Repair # of packets: 1   Blood loss (ml): 150     Birth information:  YOB: 2018   Time of birth: 10:56 PM   Sex: male   Delivery type: Vaginal, Spontaneous Delivery   Gestational Age: 32w5d    Delivery Clinician:      Other providers:       Additional  information:  Forceps:    Vacuum:    Breech:    Observed anomalies      Living?:           APGARS  One minute Five minutes Ten minutes   Skin color:         Heart rate:         Grimace:         Muscle tone:         Breathing:         Totals: 9  9        Placenta: Delivered:       appearance    Tubal Ligation: n/a    Feeding Method: the patient is currently not breastfeeding.    Immunization Hx:  Immunization History   Administered Date(s) Administered    Tdap 2013       Final Diagnoses:   Principal Problem: Labor, premature, with delivery, baby delivered   Secondary Diagnoses:   Active Hospital Problems    Diagnosis  POA    *Labor, premature,  with delivery, baby delivered [O60.10X0]  No    No prenatal care in current pregnancy in third trimester [O09.33]  Not Applicable      Resolved Hospital Problems    Diagnosis Date Resolved POA    Premature labor after 22 weeks, but before 37 completed weeks of gestation without delivery [O60.00] 01/03/2017 Yes       Discharged Condition: good    Disposition: Home or Self Care    Follow Up/Patient Instructions:     Medications:   Tyson Ricks   Home Medication Instructions ALMITA:[id number]    Printed on:01/04/17 0844   Medication Information                      diclofenac (VOLTAREN) 50 MG EC tablet  Take 1 tablet (50 mg total) by mouth 3 (three) times daily as needed (PAIN).             ibuprofen (ADVIL,MOTRIN) 600 MG tablet  Take 1 tablet (600 mg total) by mouth every 6 (six) hours.               No discharge procedures on file.  Follow-up Information     Follow up In 4 weeks.      regular diet  Activity as tolerated

## 2017-01-04 NOTE — PLAN OF CARE
Problem: Patient Care Overview  Goal: Plan of Care Review  Outcome: Ongoing (interventions implemented as appropriate)  Patient complains of lower abdominal cramping with pain level at 8-9/10 after receiving Percocet 10 and ibuprofen. Notified midwife, order given for extra dose of Percocet x1, pt able to sleep after this, but upon awakening, still states that pain is 8/10. Heating pad also given, pt states heat helps to relieve pain, and she can tolerate the pain. Patient is ambulating and voiding without difficulty. Fundus is firm, lochia small. Patient bonding well with baby.

## 2017-01-04 NOTE — DISCHARGE INSTRUCTIONS
"Mother Self Care:    Activity: Avoid strenuous exercise and get adequate rest.  No driving until the physician consent given.  Emotional Changes: Most women find birth to be a time of great emotional upheaval.  Sense of loss, mood swings, fatigue, anxiety, and feeling "let down" are common.  If feelings worsen or last more than a week, call your physician.  Breast Care/Breastfeeding: Wear a bra for comfort.  Keep nipples dry and apply your own breast milk or lanolin cream as needed for soreness.  Engorgement can be relieved with warm, moist heat before feedings.  You may also take Ibuprofen.  Breast Care/Bottle Feeding: Wear support bra 24 hours a day for one week.  Avoid stimulation to breasts.  You may use ice packs for discomfort.  Mariam-Care/Vaginal Bleeding: Remember to use your mariam-bottle after urinating.  Your flow will change from red, to pink, to yellow/white color over a period of 2 weeks.  Menstruation will return in 3-8 weeks, or longer if breastfeeding.  Episiotomy Vaginal Delivery: Stitches will dissolve within 10 days to 3 weeks.  Warm baths, tucks, and dermoplast will promote healing.  Avoid bubble baths or strong soaps.   Section/Tubal Ligation: Keep incision clean and dry.  Please remove steri-strips in 5-7 days.  You may shower, but avoid baths.  Sexual Activity/Pelvic Rest: No sexual activity, tampons, or douching until your physician gives you consent.  Diet: Continue to eat from the five basic food groups, including plenty of protein, fruits, vegetables, and whole grains.  Limit empty calories and high fat foods.  Drink enough fluids to satisfy thirst and add an extra 500 calories for breastfeeding.  Constipation/Hemorrhoids: Drink plenty of water.  You may take a stool softener or natural laxative (Metamucil). You may use tucks or hemorrhoid ointment and soak in a warm tub.    CALL YOUR OB DOCTOR IF ANY OF THE FOLLOWING OCCURS:  *Heavy bleeding - saturating a pad an hour or passing any " large (2-3 inches in size) blood clots.  *Any pain, redness, or tenderness in lower leg.  *You cannot care for yourself or your baby.  *Any signs of infection-      - Temperature greater than 100.5 degrees F      - Foul smelling vaginal discharge and/or incisional drainage      - Increased episiotomy or incisional pain      - Hot, hard, red or sore area on breast      - Flu-like symptoms      - Any urgency, frequency or burning with urination

## 2017-01-05 LAB — BACTERIA SPEC AEROBE CULT: NORMAL

## 2017-01-05 NOTE — PROGRESS NOTES
Discharge instructions given and reviewed with pt. Questions answered at this time. Pt verbalized understanding. Vss. Patient to room in with infant, who is staying 48hrs. States pain has been 9/10 but tolerable with pain meds. Paper prescription given for motrin but patient does not have any family to pick it up for her. Expressed importance of needing to go to follow up appointments, verbalized understanding.

## 2017-01-06 LAB — RUBV IGM SER IA-ACNC: <10 AU/ML

## 2017-03-20 ENCOUNTER — HOSPITAL ENCOUNTER (EMERGENCY)
Facility: HOSPITAL | Age: 32
Discharge: HOME | End: 2017-03-20
Attending: EMERGENCY MEDICINE
Payer: MEDICAID

## 2017-03-20 VITALS
RESPIRATION RATE: 18 BRPM | HEIGHT: 66 IN | OXYGEN SATURATION: 98 % | HEART RATE: 85 BPM | DIASTOLIC BLOOD PRESSURE: 75 MMHG | TEMPERATURE: 98 F | SYSTOLIC BLOOD PRESSURE: 135 MMHG | BODY MASS INDEX: 35.36 KG/M2 | WEIGHT: 220 LBS

## 2017-03-20 DIAGNOSIS — E16.2 HYPOGLYCEMIA: ICD-10-CM

## 2017-03-20 DIAGNOSIS — R10.13 ACUTE EPIGASTRIC PAIN: Primary | ICD-10-CM

## 2017-03-20 DIAGNOSIS — E87.6 HYPOKALEMIA: ICD-10-CM

## 2017-03-20 LAB
ALBUMIN SERPL BCP-MCNC: 3.9 G/DL
ALP SERPL-CCNC: 72 U/L
ALT SERPL W/O P-5'-P-CCNC: 11 U/L
ANION GAP SERPL CALC-SCNC: 10 MMOL/L
AST SERPL-CCNC: 10 U/L
BASOPHILS # BLD AUTO: 0.03 K/UL
BASOPHILS NFR BLD: 0.2 %
BILIRUB SERPL-MCNC: 0.1 MG/DL
BUN SERPL-MCNC: 8 MG/DL
CALCIUM SERPL-MCNC: 9.6 MG/DL
CHLORIDE SERPL-SCNC: 106 MMOL/L
CO2 SERPL-SCNC: 24 MMOL/L
CREAT SERPL-MCNC: 1 MG/DL
DIFFERENTIAL METHOD: ABNORMAL
EOSINOPHIL # BLD AUTO: 0.1 K/UL
EOSINOPHIL NFR BLD: 1.1 %
ERYTHROCYTE [DISTWIDTH] IN BLOOD BY AUTOMATED COUNT: 15 %
EST. GFR  (AFRICAN AMERICAN): >60 ML/MIN/1.73 M^2
EST. GFR  (NON AFRICAN AMERICAN): >60 ML/MIN/1.73 M^2
GLUCOSE SERPL-MCNC: 69 MG/DL
HCT VFR BLD AUTO: 37.6 %
HGB BLD-MCNC: 12.6 G/DL
LIPASE SERPL-CCNC: 53 U/L
LYMPHOCYTES # BLD AUTO: 3.5 K/UL
LYMPHOCYTES NFR BLD: 28.1 %
MCH RBC QN AUTO: 27.3 PG
MCHC RBC AUTO-ENTMCNC: 33.5 %
MCV RBC AUTO: 81 FL
MONOCYTES # BLD AUTO: 1 K/UL
MONOCYTES NFR BLD: 8.2 %
NEUTROPHILS # BLD AUTO: 7.7 K/UL
NEUTROPHILS NFR BLD: 62.4 %
PLATELET # BLD AUTO: 396 K/UL
PMV BLD AUTO: 9.2 FL
POTASSIUM SERPL-SCNC: 3.4 MMOL/L
PROT SERPL-MCNC: 8.3 G/DL
RBC # BLD AUTO: 4.62 M/UL
SODIUM SERPL-SCNC: 140 MMOL/L
WBC # BLD AUTO: 12.38 K/UL

## 2017-03-20 PROCEDURE — 83690 ASSAY OF LIPASE: CPT

## 2017-03-20 PROCEDURE — 36415 COLL VENOUS BLD VENIPUNCTURE: CPT

## 2017-03-20 PROCEDURE — 99283 EMERGENCY DEPT VISIT LOW MDM: CPT

## 2017-03-20 PROCEDURE — 80053 COMPREHEN METABOLIC PANEL: CPT

## 2017-03-20 PROCEDURE — 25000003 PHARM REV CODE 250: Performed by: PHYSICIAN ASSISTANT

## 2017-03-20 PROCEDURE — 85025 COMPLETE CBC W/AUTO DIFF WBC: CPT

## 2017-03-20 RX ORDER — POTASSIUM CHLORIDE 20 MEQ/1
40 TABLET, EXTENDED RELEASE ORAL
Status: COMPLETED | OUTPATIENT
Start: 2017-03-20 | End: 2017-03-20

## 2017-03-20 RX ORDER — PANTOPRAZOLE SODIUM 20 MG/1
20 TABLET, DELAYED RELEASE ORAL DAILY
Qty: 30 TABLET | Refills: 0 | Status: SHIPPED | OUTPATIENT
Start: 2017-03-20 | End: 2018-03-20

## 2017-03-20 RX ORDER — ONDANSETRON 4 MG/1
4 TABLET, ORALLY DISINTEGRATING ORAL
Status: COMPLETED | OUTPATIENT
Start: 2017-03-20 | End: 2017-03-20

## 2017-03-20 RX ORDER — PANTOPRAZOLE SODIUM 40 MG/1
40 TABLET, DELAYED RELEASE ORAL
Status: COMPLETED | OUTPATIENT
Start: 2017-03-20 | End: 2017-03-20

## 2017-03-20 RX ORDER — ONDANSETRON 4 MG/1
4 TABLET, FILM COATED ORAL EVERY 6 HOURS PRN
Qty: 12 TABLET | Refills: 0 | Status: SHIPPED | OUTPATIENT
Start: 2017-03-20

## 2017-03-20 RX ADMIN — PANTOPRAZOLE SODIUM 40 MG: 40 TABLET, DELAYED RELEASE ORAL at 04:03

## 2017-03-20 RX ADMIN — ONDANSETRON 4 MG: 4 TABLET, ORALLY DISINTEGRATING ORAL at 02:03

## 2017-03-20 RX ADMIN — LIDOCAINE HYDROCHLORIDE 50 ML: 20 SOLUTION ORAL; TOPICAL at 02:03

## 2017-03-20 RX ADMIN — POTASSIUM CHLORIDE 40 MEQ: 1500 TABLET, EXTENDED RELEASE ORAL at 04:03

## 2017-03-20 NOTE — ED NOTES
Patient complains of epigastric pain, worsens with movement and belching.    Level of Consciousness: The patient is awake, alert, and oriented   Appearance: Sitting up in treatment area with no acute distress noted. Clothing and hygiene are clean and worn appropriately.  Skin: Skin is intact; color consistent with ethnicity.    Musculoskeletal: Moves all extremities well in full range of motion. No obvious deformities or swelling noted.  Respiratory: Airway open and patent, respirations spontaneous, even and unlabored. No accessory muscles in use.   Cardiac: Regular rate, no peripheral edema noted..  Abdomen:  No distention noted.  Neurologic: PERRLA, face exhibits symmetrical expression, reports normal sensation to all extremities and face.    Patient verbalized understanding of status and plan of care.

## 2017-03-20 NOTE — ED PROVIDER NOTES
Encounter Date: 3/20/2017       History     Chief Complaint   Patient presents with    Epigastric pain     for 3 days      Review of patient's allergies indicates:  No Known Allergies  Patient is a 31 y.o. female presenting with the following complaint: abdominal pain. The history is provided by the patient.   Abdominal Pain   The current episode started two days ago. The onset of the illness was gradual. The problem has been gradually worsening. The abdominal pain is located in the epigastric region. The abdominal pain does not radiate. The severity of the abdominal pain is 4/10. The abdominal pain is relieved by nothing. The abdominal pain is exacerbated by belching and movement. The other symptoms of the illness do not include fever, fatigue, jaundice, melena, nausea, vomiting, diarrhea, dysuria, hematemesis, hematochezia, vaginal discharge or vaginal bleeding.   The patient states that she believes she is currently not pregnant. The patient has not had a change in bowel habit. Additional symptoms associated with the illness include heartburn. Symptoms associated with the illness do not include chills, diaphoresis, constipation, urgency, hematuria, frequency or back pain. Significant associated medical issues include GERD. Significant associated medical issues do not include PUD, diabetes, sickle cell disease, gallstones, liver disease, substance abuse, diverticulitis, HIV or cardiac disease.     Past Medical History:   Diagnosis Date    Anemia     Miscarriage     x2    Twins      Past Surgical History:   Procedure Laterality Date    TONSILLECTOMY, ADENOIDECTOMY       History reviewed. No pertinent family history.  Social History   Substance Use Topics    Smoking status: Current Every Day Smoker     Packs/day: 0.50    Smokeless tobacco: Never Used    Alcohol use No     Review of Systems   Constitutional: Negative for chills, diaphoresis, fatigue and fever.   Respiratory: Negative for cough and chest  tightness.    Cardiovascular: Negative for chest pain, palpitations and leg swelling.   Gastrointestinal: Positive for abdominal pain and heartburn. Negative for constipation, diarrhea, hematemesis, hematochezia, jaundice, melena, nausea and vomiting.   Genitourinary: Negative for dysuria, frequency, hematuria, urgency, vaginal bleeding and vaginal discharge.   Musculoskeletal: Negative for back pain.   Skin: Negative for rash.   Neurological: Negative for seizures and weakness.   Psychiatric/Behavioral: Negative for confusion.       Physical Exam   Initial Vitals   BP Pulse Resp Temp SpO2   03/20/17 1301 03/20/17 1301 03/20/17 1301 -- 03/20/17 1301   136/78 87 18  97 %     Physical Exam    Nursing note and vitals reviewed.  Constitutional: She appears well-developed and well-nourished.   HENT:   Mouth/Throat: Oropharynx is clear and moist.   Eyes: Conjunctivae are normal. No scleral icterus.   Cardiovascular: Normal rate, regular rhythm and intact distal pulses.   Pulmonary/Chest: Breath sounds normal. No respiratory distress.   Abdominal: Soft. She exhibits no distension. There is tenderness. There is no rebound and no guarding.   Epigastric tenderness to palpation. No peritoneal signs. Negative Hansen's. Negative McBurney's.    Musculoskeletal: Normal range of motion.   Neurological: She is alert and oriented to person, place, and time. She has normal strength. No cranial nerve deficit.   Skin: Skin is warm and dry.   Psychiatric: She has a normal mood and affect. Thought content normal.         ED Course   Procedures  Labs Reviewed   CBC W/ AUTO DIFFERENTIAL - Abnormal; Notable for the following:        Result Value    MCV 81 (*)     RDW 15.0 (*)     Platelets 396 (*)     All other components within normal limits   COMPREHENSIVE METABOLIC PANEL - Abnormal; Notable for the following:     Potassium 3.4 (*)     Glucose 69 (*)     All other components within normal limits   LIPASE     Results for orders placed or  performed during the hospital encounter of 03/20/17   CBC auto differential   Result Value Ref Range    WBC 12.38 3.90 - 12.70 K/uL    RBC 4.62 4.00 - 5.40 M/uL    Hemoglobin 12.6 12.0 - 16.0 g/dL    Hematocrit 37.6 37.0 - 48.5 %    MCV 81 (L) 82 - 98 fL    MCH 27.3 27.0 - 31.0 pg    MCHC 33.5 32.0 - 36.0 %    RDW 15.0 (H) 11.5 - 14.5 %    Platelets 396 (H) 150 - 350 K/uL    MPV 9.2 9.2 - 12.9 fL    Gran # 7.7 1.8 - 7.7 K/uL    Lymph # 3.5 1.0 - 4.8 K/uL    Mono # 1.0 0.3 - 1.0 K/uL    Eos # 0.1 0.0 - 0.5 K/uL    Baso # 0.03 0.00 - 0.20 K/uL    Gran% 62.4 38.0 - 73.0 %    Lymph% 28.1 18.0 - 48.0 %    Mono% 8.2 4.0 - 15.0 %    Eosinophil% 1.1 0.0 - 8.0 %    Basophil% 0.2 0.0 - 1.9 %    Differential Method Automated    Comprehensive metabolic panel   Result Value Ref Range    Sodium 140 136 - 145 mmol/L    Potassium 3.4 (L) 3.5 - 5.1 mmol/L    Chloride 106 95 - 110 mmol/L    CO2 24 23 - 29 mmol/L    Glucose 69 (L) 70 - 110 mg/dL    BUN, Bld 8 6 - 20 mg/dL    Creatinine 1.0 0.5 - 1.4 mg/dL    Calcium 9.6 8.7 - 10.5 mg/dL    Total Protein 8.3 6.0 - 8.4 g/dL    Albumin 3.9 3.5 - 5.2 g/dL    Total Bilirubin 0.1 0.1 - 1.0 mg/dL    Alkaline Phosphatase 72 55 - 135 U/L    AST 10 10 - 40 U/L    ALT 11 10 - 44 U/L    Anion Gap 10 8 - 16 mmol/L    eGFR if African American >60 >60 mL/min/1.73 m^2    eGFR if non African American >60 >60 mL/min/1.73 m^2   Lipase   Result Value Ref Range    Lipase 53 4 - 60 U/L                                 ED Course     Clinical Impression:   The primary encounter diagnosis was Acute epigastric pain. Diagnoses of Hypokalemia and Hypoglycemia were also pertinent to this visit.    Disposition:   Disposition: Discharged  Condition: Stable       Spike Castro PA-C  03/20/17 1602

## 2017-03-20 NOTE — DISCHARGE INSTRUCTIONS
Epigastric Pain (Uncertain Cause)    Epigastric pain can be a sign of disease in the upper abdomen. Common causes include:  · Acid reflux (stomach acid flowing up into the esophagus)  · Gastritis (irritation of the stomach lining)  · Peptic Ulcer Disease  · Inflammation of the pancreas  · Gallstone  · Infection in the gallbladder  Pain may be dull or burning. It may spread upward to the chest or to the back. There may be other symptoms such as belching, bloating, cramps or hunger pains. There may be weight loss or poor appetite, nausea or vomiting.  Since the diagnosis of your pain is not certain yet, further tests may sometimes be needed. Sometimes the doctor will treat you for the most likely condition to see if there is improvement before doing further tests.  Home care  Medicines  · Antacids help neutralize the normal acids in your stomach. Examples are Maalox, Mylanta, Rolaids, and Tums. If you dont like the liquid, you can also try a chewable one. You may find one works better than another for you. Overuse can cause diarrhea or constipation.  · Acid blockers (H2 blockers) decrease acid production. Examples are cimetidine (Tagamet), famotidine (Pepcid) and ranitidine (Zantac).  · Acid inhibitors (PPIs) decrease acid production in a different way than the blockers. You may find they work better, but can take a little longer to take effect.  Examples are omeprazole (Prilosec), lansoprazole (Prevacid), pantoprazole (Protonix), rabeprazole (Aciphex), and esomeprazole (Nexium).  · Take an antacid 30-60 minutes after eating and at bedtime, but not at the same time as an acid blocker.  · Try not to take NSAIDs. Aspirin may also cause problems, but if taking it for your heart or other medical reasons, talk to your doctor before stopping it; you do not want to cause a worse problem, like a heart attack or stroke.  Diet  · If certain foods seem to cause your spasm, try to avoid them.   · Eat slowly and chew food well  before swallowing. Symptoms of gastritis can be worsened by certain foods. Limit or avoid fatty, fried, and spicy foods, as well as coffee, chocolate, mint, and foods with high acid content such as tomatoes and citrus fruit and juices (orange, grapefruit, lemon).  · Avoid alcohol, caffeine, and tobacco, which can delay healing and worsen your problem.  · Try eating smaller meals with snacks in between  Follow-up care  Follow up with your healthcare provider or as advised.  When to seek medical advice  Call your healthcare provider right away if any of the following occur:  · Stomach pain worsens or moves to the right lower part of the abdomen  · Chest pain appears, or if it worsens or spreads to the chest, back, neck, shoulder, or arm  · Frequent vomiting (cant keep down liquids)  · Blood in the stool or vomit (red or black color)  · Feeling weak or dizzy, fainting, or having trouble breathing  · Fever of 100.4ºF (38ºC) or higher, or as directed by your healthcare provider  · Abdominal swelling  Date Last Reviewed: 9/25/2015  © 5255-3817 [employer]. [address] [place] [address]. All rights reserved. This information is not intended as a substitute for professional medical care. Always follow your healthcare professional's instructions.          Hypoglycemic Reaction (Nondiabetic)  You have had an episode of low blood sugar (hypoglycemia). A single episode of hypoglycemia does not mean that you have diabetes or that this problem will recur. There are many causes for low blood sugar. These include eating highly refined starchy foods (carbohydrates), drinking too much alcohol, intense exercise, fatigue, stress, poor diet, pregnancy, and certain illnesses.  Your blood sugar level may also be affected by tobacco, caffeine, and certain medicines, including:  · Aspirin  · Haloperidol  · Propoxyphene  · Chlorpromazine  · Propranolol  · Disopyramide  · ACE inhibitors  A class of medicine called  beta-blockers is used for high blood pressure, rapid heart rates, and other conditions. Beta-blockers may prevent the early symptoms of low blood sugar. If you are taking a beta-blocker, you might not realize that your blood sugar is getting low. If you are taking a beta-blocker and are prone to low blood sugar, talk to your healthcare provider about switching to a different class of medicine. The beta-blocker class includes:  · Propranolol  · Atenolol  · Metoprolol  · Nadolol  · Labetalol  · Carvedilol  Home care  · Rest today and resume a normal diet. Eliminate any of the above known causes where possible.  · The proper diet for true hypoglycemia (diagnosed with a glucose tolerance test) is high protein (20% of calories), low carbohydrate (50% of calories), and moderate fat (30% of calories) in 6 small meals per day.  · If this is your first episode of low blood sugar, or if you have not yet been tested with a glucose tolerance test, eat small frequent meals rather than fewer large meals. Limit starchy foods during the next 1 to 2 days to avoid a recurrence of low blood sugar.  · It is important to learn the warning signals your body gives as your blood sugar starts to drop. See the symptoms listed below.  If symptoms of hypoglycemia return  · Keep a source of fast-acting sugar with you. At the first sign of low blood sugar, eat or drink 15 to 20 grams of fast-acting sugar. Examples include:  ¨ 3 to 4 glucose tablets (found at most drugstores)  ¨ 4 ounces of regular soda  ¨ 4 ounces of fruit juice  ¨ 2 tablespoons of raisins  ¨ 1 tablespoon of honey  · If consuming fast-acting sugar does not improve your symptoms within 20 minutes, go to an emergency room.  · If you have severe hypoglycemic spells, wear a medical alert bracelet or carry a card in your wallet describing this condition. If you have a severe hypoglycemic reaction and are unable to give this information, it will help medical staff provide proper  care.  Follow-up care  Follow up with your healthcare provider, or as advised.  When to seek medical advice  Call your healthcare provider right away if any of these symptoms of low blood sugar occur.  · Fatigue or headache  · Trembling or excess sweating  · Hunger  · Feeling anxious or restless  · Vision changes  · Irritability  · Sleepiness  · Dizziness  Call 911  Contact emergency services right away if any of these occur:  · Drowsiness  · Weakness  · Confusion  · Loss of consciousness  Date Last Reviewed: 8/24/2015 © 2000-2016 [employer]. [address] [place] [id number]. All rights reserved. This information is not intended as a substitute for professional medical care. Always follow your healthcare professional's instructions.          Discharge Instructions for Hypokalemia  You have been diagnosed with hypokalemia. This means you have a low level of potassium in your blood. Potassium helps your nerve and muscle cells work as they should. These cells include the cells in your heart. A low level of potassium in the blood can cause serious problems, such as abnormal heart rhythms and even heart attack.  Diet changes  Eat more potassium-rich foods:  · Bananas  · Oranges and orange juice  · Tomatoes, tomato sauce, and tomato juice  · Leafy green vegetables, such as spinach, kale, salad greens, collards, and chard  · Melons (all kinds)  · Pomegranates  · Peas  · Beans  · Potatoes  · Sweet potatoes  · Avocados, including guacamole  · Vegetable juices, such as V8  · Fruit juices  · All nuts and seeds  · Fish, including tuna, halibut, salmon, cod, snapper, ziyad, swordfish, and perch  · Milk, including fat-free, low-fat, whole, chocolate, and buttermilk  · Soy milk  Other home care  · Take a potassium supplement as directed by your healthcare provider.  · After strenuous exercise or any activity that causes you to sweat a lot, grab a beverage high in potassium. This includes chocolate milk,  coconut water, orange juice, or low-sodium vegetable juices.  · Be sure to eat foods or drink fluids that contain potassium if you are having diarrhea or vomiting.  · Have your potassium levels checked regularly.  · Take all medicines exactly as directed.  · Tell your healthcare provider about all prescription and ove-the-counter medicines you are taking. This includes herbal products.  · Avoid foods that are high in salt. Avoid canned and prepared foods that are high in salt.  Follow-up  · Make a follow-up appointment as directed by our staff.  · Keep all follow-up appointments. Your healthcare provider needs to monitor your condition closely.     When to call your healthcare provider  Call your provider right away or go to the emergency room if you have any of the following:  · Vomiting  · Fatigue  · Diarrhea  · Rapid, irregular heartbeat  · Shortness of breath  · Chest pain  · Muscle cramps, spasms, or twitching  · Weakness  · Paralysis   Date Last Reviewed: 6/20/2015  © 2645-1088 [employer]. [address] [place] [address], [place] [address]. All rights reserved. This information is not intended as a substitute for professional medical care. Always follow your healthcare professional's instructions.

## 2017-03-20 NOTE — ED AVS SNAPSHOT
[facility] - [address] [address]               Tyson Ricks   3/20/2017  1:07 PM   ED    Description:  Female : 1985   Department:  [facility] -            Your Care was Coordinated By:     Provider Role From To    Sandra Mac MD Attending Provider 17 1307 17 1320    Sarthak Bellamy MD Attending Provider 17 [phone] --    Spike Castro PA-C Physician Assistant 17 1308 --      Reason for Visit     Epigastric pain           Diagnoses this Visit        Comments    Acute epigastric pain    -  Primary     Hypokalemia         Hypoglycemia           ED Disposition     None           To Do List           Follow-up Information     Follow up with [facility].    Specialty:  Emergency Medicine    Why:  If symptoms worsen in any way. Follow up with your primary care physician in the next 1-2 days for re-evaluation and further management.     Contact information:    [address] [place] [address] [phone]        Schedule an appointment as soon as possible for a visit with [facility].    Specialty:  Gastroenterology    Why:  As needed follow up with [facility] for further evaluation and management of epigastric pain.     Contact information:    [address]  [facility] [address] [phone]    Additional information:    (off [employer] [facility]) 3rd [facility]       These Medications        Disp Refills Start End    pantoprazole (PROTONIX) 20 MG tablet 30 tablet 0 3/20/2017 3/20/2018    Take 1 tablet (20 mg total) by mouth once daily. - Oral    ondansetron (ZOFRAN) 4 MG tablet 12 tablet 0 3/20/2017     Take 1 tablet (4 mg total) by mouth every 6 (six) hours as needed for Nausea. - Oral      [facility]     [facility] -  Assistance  Registered nurses in the [facility] provide clinical  advisement, health education, appointment booking, and other advisory services.  Call for this free service at [phone].             Medications           Message regarding Medications     Verify the changes and/or additions to your medication regime listed below are the same as discussed with your clinician today.  If any of these changes or additions are incorrect, please notify your healthcare provider.        START taking these NEW medications        Refills    pantoprazole (PROTONIX) 20 MG tablet 0    Sig: Take 1 tablet (20 mg total) by mouth once daily.    Class: Print    Route: Oral    ondansetron (ZOFRAN) 4 MG tablet 0    Sig: Take 1 tablet (4 mg total) by mouth every 6 (six) hours as needed for Nausea.    Class: Print    Route: Oral      These medications were administered today        Dose Freq    GI cocktail (mylanta 30 mL, lidocaine 2 % viscous 10 mL, dicyclomine 10 mL) 50 mL  ED 1 Time    Sig: Take by mouth ED 1 Time.    Class: Normal    Route: Oral    Cosign for Ordering: Accepted by Sarthak Bellamy MD on 3/20/2017  2:37 PM    ondansetron disintegrating tablet 4 mg 4 mg ED 1 Time    Sig: Take 1 tablet (4 mg total) by mouth ED 1 Time.    Class: Normal    Route: Oral    Cosign for Ordering: Accepted by Sarthak Bellamy MD on 3/20/2017  2:37 PM    potassium chloride SA CR tablet 40 mEq 40 mEq ED 1 Time    Sig: Take 2 tablets (40 mEq total) by mouth ED 1 Time.    Class: Normal    Route: Oral    Cosign for Ordering: Required by Sarthak Bellamy MD      STOP taking these medications     ibuprofen (ADVIL,MOTRIN) 600 MG tablet Take 1 tablet (600 mg total) by mouth every 6 (six) hours.    diclofenac (VOLTAREN) 50 MG EC tablet Take 1 tablet (50 mg total) by mouth 3 (three) times daily as needed (PAIN).           Verify that the below list of medications is an accurate representation of the medications you are currently taking.  If none reported, the list may be blank. If incorrect, please contact  "your healthcare provider. Carry this list with you in case of emergency.           Current Medications     ondansetron (ZOFRAN) 4 MG tablet Take 1 tablet (4 mg total) by mouth every 6 (six) hours as needed for Nausea.    pantoprazole (PROTONIX) 20 MG tablet Take 1 tablet (20 mg total) by mouth once daily.    potassium chloride SA CR tablet 40 mEq Take 2 tablets (40 mEq total) by mouth ED 1 Time.           Clinical Reference Information           Your Vitals Were     BP Pulse Resp Height Weight Last Period    136/78 (BP Location: Right arm, Patient Position: Sitting) 87 18 5' 6" (1.676 m) 99.8 kg (220 lb) 03/20/2017 (Exact Date)    SpO2 BMI             97% 35.51 kg/m2         Allergies as of 3/20/2017     No Known Allergies      Immunizations Administered on Date of Encounter - 3/20/2017     None      ED Micro, Lab, POCT     Start Ordered       Status Ordering Provider    03/20/17 1322 03/20/17 1321  CBC auto differential  STAT      Final result     03/20/17 1322 03/20/17 1321  Comprehensive metabolic panel  STAT      Final result     03/20/17 1322 03/20/17 1321  Lipase  STAT      Final result       ED Imaging Orders     None        Discharge Instructions         Epigastric Pain (Uncertain Cause)    Epigastric pain can be a sign of disease in the upper abdomen. Common causes include:  · Acid reflux (stomach acid flowing up into the esophagus)  · Gastritis (irritation of the stomach lining)  · Peptic Ulcer Disease  · Inflammation of the pancreas  · Gallstone  · Infection in the gallbladder  Pain may be dull or burning. It may spread upward to the chest or to the back. There may be other symptoms such as belching, bloating, cramps or hunger pains. There may be weight loss or poor appetite, nausea or vomiting.  Since the diagnosis of your pain is not certain yet, further tests may sometimes be needed. Sometimes the doctor will treat you for the most likely condition to see if there is improvement before doing further " tests.  Home care  Medicines  · Antacids help neutralize the normal acids in your stomach. Examples are Maalox, Mylanta, Rolaids, and Tums. If you dont like the liquid, you can also try a chewable one. You may find one works better than another for you. Overuse can cause diarrhea or constipation.  · Acid blockers (H2 blockers) decrease acid production. Examples are cimetidine (Tagamet), famotidine (Pepcid) and ranitidine (Zantac).  · Acid inhibitors (PPIs) decrease acid production in a different way than the blockers. You may find they work better, but can take a little longer to take effect.  Examples are omeprazole (Prilosec), lansoprazole (Prevacid), pantoprazole (Protonix), rabeprazole (Aciphex), and esomeprazole (Nexium).  · Take an antacid 30-60 minutes after eating and at bedtime, but not at the same time as an acid blocker.  · Try not to take NSAIDs. Aspirin may also cause problems, but if taking it for your heart or other medical reasons, talk to your doctor before stopping it; you do not want to cause a worse problem, like a heart attack or stroke.  Diet  · If certain foods seem to cause your spasm, try to avoid them.   · Eat slowly and chew food well before swallowing. Symptoms of gastritis can be worsened by certain foods. Limit or avoid fatty, fried, and spicy foods, as well as coffee, chocolate, mint, and foods with high acid content such as tomatoes and citrus fruit and juices (orange, grapefruit, lemon).  · Avoid alcohol, caffeine, and tobacco, which can delay healing and worsen your problem.  · Try eating smaller meals with snacks in between  Follow-up care  Follow up with your healthcare provider or as advised.  When to seek medical advice  Call your healthcare provider right away if any of the following occur:  · Stomach pain worsens or moves to the right lower part of the abdomen  · Chest pain appears, or if it worsens or spreads to the chest, back, neck, shoulder, or arm  · Frequent vomiting  (cant keep down liquids)  · Blood in the stool or vomit (red or black color)  · Feeling weak or dizzy, fainting, or having trouble breathing  · Fever of 100.4ºF (38ºC) or higher, or as directed by your healthcare provider  · Abdominal swelling  Date Last Reviewed: 9/25/2015  © 1862-9841 [employer]. [address] [place] [address]. All rights reserved. This information is not intended as a substitute for professional medical care. Always follow your healthcare professional's instructions.          Hypoglycemic Reaction (Nondiabetic)  You have had an episode of low blood sugar (hypoglycemia). A single episode of hypoglycemia does not mean that you have diabetes or that this problem will recur. There are many causes for low blood sugar. These include eating highly refined starchy foods (carbohydrates), drinking too much alcohol, intense exercise, fatigue, stress, poor diet, pregnancy, and certain illnesses.  Your blood sugar level may also be affected by tobacco, caffeine, and certain medicines, including:  · Aspirin  · Haloperidol  · Propoxyphene  · Chlorpromazine  · Propranolol  · Disopyramide  · ACE inhibitors  A class of medicine called beta-blockers is used for high blood pressure, rapid heart rates, and other conditions. Beta-blockers may prevent the early symptoms of low blood sugar. If you are taking a beta-blocker, you might not realize that your blood sugar is getting low. If you are taking a beta-blocker and are prone to low blood sugar, talk to your healthcare provider about switching to a different class of medicine. The beta-blocker class includes:  · Propranolol  · Atenolol  · Metoprolol  · Nadolol  · Labetalol  · Carvedilol  Home care  · Rest today and resume a normal diet. Eliminate any of the above known causes where possible.  · The proper diet for true hypoglycemia (diagnosed with a glucose tolerance test) is high protein (20% of calories), low carbohydrate (50% of  calories), and moderate fat (30% of calories) in 6 small meals per day.  · If this is your first episode of low blood sugar, or if you have not yet been tested with a glucose tolerance test, eat small frequent meals rather than fewer large meals. Limit starchy foods during the next 1 to 2 days to avoid a recurrence of low blood sugar.  · It is important to learn the warning signals your body gives as your blood sugar starts to drop. See the symptoms listed below.  If symptoms of hypoglycemia return  · Keep a source of fast-acting sugar with you. At the first sign of low blood sugar, eat or drink 15 to 20 grams of fast-acting sugar. Examples include:  ¨ 3 to 4 glucose tablets (found at most drugstores)  ¨ 4 ounces of regular soda  ¨ 4 ounces of fruit juice  ¨ 2 tablespoons of raisins  ¨ 1 tablespoon of honey  · If consuming fast-acting sugar does not improve your symptoms within 20 minutes, go to an emergency room.  · If you have severe hypoglycemic spells, wear a medical alert bracelet or carry a card in your wallet describing this condition. If you have a severe hypoglycemic reaction and are unable to give this information, it will help medical staff provide proper care.  Follow-up care  Follow up with your healthcare provider, or as advised.  When to seek medical advice  Call your healthcare provider right away if any of these symptoms of low blood sugar occur.  · Fatigue or headache  · Trembling or excess sweating  · Hunger  · Feeling anxious or restless  · Vision changes  · Irritability  · Sleepiness  · Dizziness  Call 911  Contact emergency services right away if any of these occur:  · Drowsiness  · Weakness  · Confusion  · Loss of consciousness  Date Last Reviewed: 8/24/2015  © 9357-9021 [employer]. [address] [place] [address], [place] [address]. All rights reserved. This information is not intended as a substitute for professional medical care. Always follow your healthcare professional's  instructions.          Discharge Instructions for Hypokalemia  You have been diagnosed with hypokalemia. This means you have a low level of potassium in your blood. Potassium helps your nerve and muscle cells work as they should. These cells include the cells in your heart. A low level of potassium in the blood can cause serious problems, such as abnormal heart rhythms and even heart attack.  Diet changes  Eat more potassium-rich foods:  · Bananas  · Oranges and orange juice  · Tomatoes, tomato sauce, and tomato juice  · Leafy green vegetables, such as spinach, kale, salad greens, collards, and chard  · Melons (all kinds)  · Pomegranates  · Peas  · Beans  · Potatoes  · Sweet potatoes  · Avocados, including guacamole  · Vegetable juices, such as V8  · Fruit juices  · All nuts and seeds  · Fish, including tuna, halibut, salmon, cod, snapper, ziyad, swordfish, and perch  · Milk, including fat-free, low-fat, whole, chocolate, and buttermilk  · Soy milk  Other home care  · Take a potassium supplement as directed by your healthcare provider.  · After strenuous exercise or any activity that causes you to sweat a lot, grab a beverage high in potassium. This includes chocolate milk, coconut water, orange juice, or low-sodium vegetable juices.  · Be sure to eat foods or drink fluids that contain potassium if you are having diarrhea or vomiting.  · Have your potassium levels checked regularly.  · Take all medicines exactly as directed.  · Tell your healthcare provider about all prescription and ove-the-counter medicines you are taking. This includes herbal products.  · Avoid foods that are high in salt. Avoid canned and prepared foods that are high in salt.  Follow-up  · Make a follow-up appointment as directed by our staff.  · Keep all follow-up appointments. Your healthcare provider needs to monitor your condition closely.     When to call your healthcare provider  Call your provider right away or go to the emergency room  if you have any of the following:  · Vomiting  · Fatigue  · Diarrhea  · Rapid, irregular heartbeat  · Shortness of breath  · Chest pain  · Muscle cramps, spasms, or twitching  · Weakness  · Paralysis   Date Last Reviewed: 6/20/2015  © 1514-2310 [employer]. [address] [place] [address]. All rights reserved. This information is not intended as a substitute for professional medical care. Always follow your healthcare professional's instructions.          [facility] Sign-Up     Activating your [facility] account is as easy as 1-2-3!     1) Visit [email or web address], select Sign Up Now, enter this activation code and your date of birth, then select Next.  [email or web address]  Expires: 5/4/2017  4:07 PM      2) Create a username and password to use when you visit [facility] in the future and select a security question in case you lose your password and select Next.    3) Enter your e-mail address and click Sign Up!    Additional Information  If you have questions, please e-mail [email or web address] or call [phone] to talk to our [facility] staff. Remember, [facility] is NOT to be used for urgent needs. For medical emergencies, dial 911.         Smoking Cessation     If you would like to quit smoking:   You may be eligible for free services if you are a [place] resident and started smoking cigarettes before September 1, 1988.  Call the Smoking Cessation Trust ([facility]) toll free at [phone] [phone] or [phone].   Call 2-416-QUIT-NOW if you do not meet the above criteria.             [facility] complies with applicable Federal civil rights laws and does not discriminate on the basis of race, color, national origin, age, disability, or sex.        Language Assistance Services     ATTENTION: Language assistance services are available, free of charge. Please call [phone].      ATENCIÓN: Si habla español, tiene a pike disposición servicios gratuitos de asistencia lingüística.  Reanna matta [phone].     KOTA Ý: N?u b?n nói Ti?ng Vi?t, có các d?ch v? h? tr? juan môn ng? mi?n phí dành cho b?n. G?i s? [phone].

## 2017-03-22 ENCOUNTER — TELEPHONE (OUTPATIENT)
Dept: GASTROENTEROLOGY | Facility: CLINIC | Age: 32
End: 2017-03-22

## 2017-03-22 NOTE — TELEPHONE ENCOUNTER
----- Message from Rhoda Kamara sent at 3/20/2017  4:49 PM CDT -----  Pt at [phone]//states she was seen at the [facility] er//was told to followup with a Gastro Dr//she is having abdominal Pain//pt has Medicaid Insurance//please call//jemal/saima

## 2017-08-18 ENCOUNTER — HOSPITAL ENCOUNTER (EMERGENCY)
Facility: HOSPITAL | Age: 32
Discharge: HOME | End: 2017-08-18
Attending: EMERGENCY MEDICINE
Payer: MEDICAID

## 2017-08-18 VITALS
HEART RATE: 114 BPM | WEIGHT: 220 LBS | SYSTOLIC BLOOD PRESSURE: 116 MMHG | RESPIRATION RATE: 18 BRPM | HEIGHT: 67 IN | TEMPERATURE: 99 F | BODY MASS INDEX: 34.53 KG/M2 | DIASTOLIC BLOOD PRESSURE: 66 MMHG | OXYGEN SATURATION: 100 %

## 2017-08-18 DIAGNOSIS — H92.02 OTALGIA, LEFT: ICD-10-CM

## 2017-08-18 DIAGNOSIS — H65.02 ACUTE SEROUS OTITIS MEDIA OF LEFT EAR, RECURRENCE NOT SPECIFIED: Primary | ICD-10-CM

## 2017-08-18 DIAGNOSIS — Z72.0 TOBACCO ABUSE DISORDER: ICD-10-CM

## 2017-08-18 PROCEDURE — 99283 EMERGENCY DEPT VISIT LOW MDM: CPT

## 2017-08-18 RX ORDER — DICLOFENAC SODIUM 50 MG/1
50 TABLET, DELAYED RELEASE ORAL 3 TIMES DAILY PRN
Qty: 20 TABLET | Refills: 0 | Status: SHIPPED | OUTPATIENT
Start: 2017-08-18

## 2017-08-18 RX ORDER — FLUTICASONE PROPIONATE 50 MCG
1 SPRAY, SUSPENSION (ML) NASAL 2 TIMES DAILY PRN
Qty: 15 G | Refills: 0 | Status: SHIPPED | OUTPATIENT
Start: 2017-08-18

## 2017-08-18 RX ORDER — DEXAMETHASONE 4 MG/1
4 TABLET ORAL DAILY
Qty: 5 TABLET | Refills: 0 | Status: SHIPPED | OUTPATIENT
Start: 2017-08-18 | End: 2017-08-23

## 2017-08-19 NOTE — ED PROVIDER NOTES
SCRIBE #1 NOTE: I, Kim Segura, am scribing for, and in the presence of, Roby Barnes NP. I have scribed the entire note.      History      Chief Complaint   Patient presents with    Otalgia     L ear x 1 week.        Review of patient's allergies indicates:   Allergen Reactions    Tramadol         HPI   HPI    8/18/2017, 8:56 PM   History obtained from the patient      History of Present Illness: Tyson Ricks is a 31 y.o. female patient who presents to the Emergency Department for otalgia which onset gradually 4 days ago. Pain is located to the left ear. Sx have been intermittent and moderate in severity. No modifying factors noted. No associated sx included. Pt denies any fever, chills, ear drainage, cough, or congestion. No further complaints at this time.       Arrival mode: Personal vehicle      PCP: Primary Doctor No       Past Medical History:  Past Medical History:   Diagnosis Date    Anemia     Miscarriage     x2    Twins        Past Surgical History:  Past Surgical History:   Procedure Laterality Date    TONSILLECTOMY, ADENOIDECTOMY           Family History:  History reviewed. No pertinent family history.    Social History:  Social History     Social History Main Topics    Smoking status: Current Every Day Smoker     Packs/day: 0.50    Smokeless tobacco: Never Used    Alcohol use No    Drug use:      Types: Marijuana    Sexual activity: Yes     Partners: Male       ROS   Review of Systems   Constitutional: Negative for chills, diaphoresis and fever.   HENT: Positive for ear pain. Negative for congestion, ear discharge and sore throat.    Respiratory: Negative for cough and shortness of breath.    Cardiovascular: Negative for chest pain.   Gastrointestinal: Negative for nausea.   Genitourinary: Negative for dysuria.   Musculoskeletal: Negative for back pain.   Skin: Negative for rash.   Neurological: Negative for weakness and headaches.   Hematological: Does not bruise/bleed easily.  "      Physical Exam      Initial Vitals [08/18/17 2020]   BP Pulse Resp Temp SpO2   116/66 (!) 114 18 99.2 °F (37.3 °C) 100 %      MAP       82.67          Physical Exam  Nursing Notes and Vital Signs Reviewed.  Constitutional: Patient is in no acute distress. Well-developed and well-nourished.  Head: Atraumatic. Normocephalic.  Eyes: PERRL. EOM intact. Conjunctivae are not pale. No scleral icterus.  Ears: Right TM normal. Left middle ear effusion. No bulging.  No perforation.   Neck: Supple. Full ROM. No lymphadenopathy.  Cardiovascular: Regular rate. Regular rhythm. No murmurs, rubs, or gallops. Distal pulses are 2+ and symmetric.  Pulmonary/Chest: No respiratory distress. Clear to auscultation bilaterally. No wheezing, rales, or rhonchi.  Abdominal: Soft and non-distended.    Musculoskeletal: Moves all extremities. No obvious deformities.   Skin: Warm and dry.  Neurological:  Alert, awake, and appropriate.  Normal speech.  No acute focal neurological deficits are appreciated.  Psychiatric: Normal affect. Good eye contact. Appropriate in content.    ED Course    Procedures  ED Vital Signs:  Vitals:    08/18/17 2020   BP: 116/66   Pulse: (!) 114   Resp: 18   Temp: 99.2 °F (37.3 °C)   TempSrc: Oral   SpO2: 100%   Weight: 99.8 kg (220 lb)   Height: 5' 6.5" (1.689 m)              The Emergency Provider reviewed the vital signs and test results, which are outlined above.    ED Discussion     9:00 PM Discharge: Initial encounter.  Pt assessed at this time.  Discussed exam results, shared treatment plan, f/u instructions, and specific conditions for return. Answered their questions at this time. Pt understands and agrees to the plan. Pt is stable for discharge.     ED Medication(s):  Medications - No data to display    Discharge Medication List as of 8/18/2017  9:02 PM      START taking these medications    Details   dexamethasone (DECADRON) 4 MG Tab Take 1 tablet (4 mg total) by mouth once daily., Starting Fri " 8/18/2017, Until Wed 8/23/2017, Print      diclofenac (VOLTAREN) 50 MG EC tablet Take 1 tablet (50 mg total) by mouth 3 (three) times daily as needed., Starting Fri 8/18/2017, Print      fluticasone (FLONASE) 50 mcg/actuation nasal spray 1 spray by Each Nare route 2 (two) times daily as needed., Starting Fri 8/18/2017, Print             Follow-up Information     [facility]    Specialty:  Emergency Medicine  Why:  As needed, If symptoms worsen  Contact information:  [address]  [facility] [address] [phone]           Schedule an appointment as soon as possible for a visit  with PCP.    Contact information:  [phone]                   Medical Decision Making        Additional MDM:   Smoking Cessation: The patient is a smoker. The patient was counseled on smoking cessation for: 3 minutes. The patient was counseled on tobacco related  health complications.        Scribe Attestation:   Scribe #1: I performed the above scribed service and the documentation accurately describes the services I performed. I attest to the accuracy of the note.    Attending:   Physician Attestation Statement for Scribe #1: I, Roby Barnes NP, personally performed the services described in this documentation, as scribed by Kim Segura, in my presence, and it is both accurate and complete.          Clinical Impression       ICD-10-CM ICD-9-CM   1. Acute serous otitis media of left ear, recurrence not specified H65.02 381.01   2. Otalgia, left H92.02 388.70   3. Tobacco abuse disorder Z72.0 305.1       Disposition:   Disposition: Discharged  Condition: Stable         Roby Barnes NP  08/19/17 1910

## 2017-11-16 ENCOUNTER — HOSPITAL ENCOUNTER (EMERGENCY)
Facility: HOSPITAL | Age: 32
Discharge: HOME | End: 2017-11-16
Attending: EMERGENCY MEDICINE
Payer: MEDICAID

## 2017-11-16 VITALS
RESPIRATION RATE: 20 BRPM | SYSTOLIC BLOOD PRESSURE: 148 MMHG | WEIGHT: 180 LBS | HEART RATE: 82 BPM | HEIGHT: 66 IN | BODY MASS INDEX: 28.93 KG/M2 | TEMPERATURE: 98 F | DIASTOLIC BLOOD PRESSURE: 71 MMHG | OXYGEN SATURATION: 99 %

## 2017-11-16 DIAGNOSIS — K80.50 BILIARY COLIC: Primary | ICD-10-CM

## 2017-11-16 DIAGNOSIS — R10.11 RUQ ABDOMINAL PAIN: ICD-10-CM

## 2017-11-16 LAB
ALBUMIN SERPL BCP-MCNC: 4.2 G/DL
ALP SERPL-CCNC: 81 U/L
ALT SERPL W/O P-5'-P-CCNC: 18 U/L
ANION GAP SERPL CALC-SCNC: 12 MMOL/L
AST SERPL-CCNC: 34 U/L
B-HCG UR QL: NEGATIVE
BASOPHILS # BLD AUTO: 0.02 K/UL
BASOPHILS NFR BLD: 0.1 %
BILIRUB SERPL-MCNC: 0.5 MG/DL
BILIRUB UR QL STRIP: NEGATIVE
BUN SERPL-MCNC: 14 MG/DL
CALCIUM SERPL-MCNC: 10 MG/DL
CHLORIDE SERPL-SCNC: 104 MMOL/L
CLARITY UR: CLEAR
CO2 SERPL-SCNC: 17 MMOL/L
COLOR UR: YELLOW
CREAT SERPL-MCNC: 0.9 MG/DL
DIFFERENTIAL METHOD: ABNORMAL
EOSINOPHIL # BLD AUTO: 0 K/UL
EOSINOPHIL NFR BLD: 0.1 %
ERYTHROCYTE [DISTWIDTH] IN BLOOD BY AUTOMATED COUNT: 14.9 %
EST. GFR  (AFRICAN AMERICAN): >60 ML/MIN/1.73 M^2
EST. GFR  (NON AFRICAN AMERICAN): >60 ML/MIN/1.73 M^2
GLUCOSE SERPL-MCNC: 107 MG/DL
GLUCOSE UR QL STRIP: NEGATIVE
HCT VFR BLD AUTO: 38.6 %
HGB BLD-MCNC: 12.8 G/DL
HGB UR QL STRIP: NEGATIVE
KETONES UR QL STRIP: ABNORMAL
LEUKOCYTE ESTERASE UR QL STRIP: NEGATIVE
LIPASE SERPL-CCNC: 23 U/L
LYMPHOCYTES # BLD AUTO: 2.3 K/UL
LYMPHOCYTES NFR BLD: 16.5 %
MCH RBC QN AUTO: 26.1 PG
MCHC RBC AUTO-ENTMCNC: 33.2 G/DL
MCV RBC AUTO: 79 FL
MONOCYTES # BLD AUTO: 0.9 K/UL
MONOCYTES NFR BLD: 6.8 %
NEUTROPHILS # BLD AUTO: 10.4 K/UL
NEUTROPHILS NFR BLD: 76.5 %
NITRITE UR QL STRIP: NEGATIVE
PH UR STRIP: >8 [PH] (ref 5–8)
PLATELET # BLD AUTO: 401 K/UL
PMV BLD AUTO: 9.3 FL
POTASSIUM SERPL-SCNC: 3.7 MMOL/L
POTASSIUM SERPL-SCNC: 7.5 MMOL/L
PROT SERPL-MCNC: 10.1 G/DL
PROT UR QL STRIP: ABNORMAL
RBC # BLD AUTO: 4.91 M/UL
SODIUM SERPL-SCNC: 133 MMOL/L
SP GR UR STRIP: 1.01 (ref 1–1.03)
URN SPEC COLLECT METH UR: ABNORMAL
UROBILINOGEN UR STRIP-ACNC: NEGATIVE EU/DL
WBC # BLD AUTO: 13.62 K/UL

## 2017-11-16 PROCEDURE — 84132 ASSAY OF SERUM POTASSIUM: CPT

## 2017-11-16 PROCEDURE — 96374 THER/PROPH/DIAG INJ IV PUSH: CPT

## 2017-11-16 PROCEDURE — 96361 HYDRATE IV INFUSION ADD-ON: CPT

## 2017-11-16 PROCEDURE — 96376 TX/PRO/DX INJ SAME DRUG ADON: CPT

## 2017-11-16 PROCEDURE — 63600175 PHARM REV CODE 636 W HCPCS: Performed by: EMERGENCY MEDICINE

## 2017-11-16 PROCEDURE — 80053 COMPREHEN METABOLIC PANEL: CPT

## 2017-11-16 PROCEDURE — 96375 TX/PRO/DX INJ NEW DRUG ADDON: CPT

## 2017-11-16 PROCEDURE — 85025 COMPLETE CBC W/AUTO DIFF WBC: CPT

## 2017-11-16 PROCEDURE — 81003 URINALYSIS AUTO W/O SCOPE: CPT

## 2017-11-16 PROCEDURE — 83690 ASSAY OF LIPASE: CPT

## 2017-11-16 PROCEDURE — 81025 URINE PREGNANCY TEST: CPT

## 2017-11-16 PROCEDURE — 99285 EMERGENCY DEPT VISIT HI MDM: CPT | Mod: 25

## 2017-11-16 PROCEDURE — 25000003 PHARM REV CODE 250: Performed by: EMERGENCY MEDICINE

## 2017-11-16 RX ORDER — METRONIDAZOLE 500 MG/1
500 TABLET ORAL
Status: COMPLETED | OUTPATIENT
Start: 2017-11-16 | End: 2017-11-16

## 2017-11-16 RX ORDER — MORPHINE SULFATE 10 MG/ML
2 INJECTION INTRAMUSCULAR; INTRAVENOUS; SUBCUTANEOUS
Status: COMPLETED | OUTPATIENT
Start: 2017-11-16 | End: 2017-11-16

## 2017-11-16 RX ORDER — PROMETHAZINE HYDROCHLORIDE 25 MG/1
25 TABLET ORAL EVERY 6 HOURS PRN
Qty: 15 TABLET | Refills: 0 | Status: SHIPPED | OUTPATIENT
Start: 2017-11-16

## 2017-11-16 RX ORDER — CIPROFLOXACIN 500 MG/1
500 TABLET ORAL 2 TIMES DAILY
Qty: 20 TABLET | Refills: 0 | Status: SHIPPED | OUTPATIENT
Start: 2017-11-16 | End: 2017-11-26

## 2017-11-16 RX ORDER — HYDROCODONE BITARTRATE AND ACETAMINOPHEN 7.5; 325 MG/1; MG/1
1 TABLET ORAL EVERY 8 HOURS PRN
Qty: 15 TABLET | Refills: 0 | Status: SHIPPED | OUTPATIENT
Start: 2017-11-16

## 2017-11-16 RX ORDER — METRONIDAZOLE 500 MG/1
500 TABLET ORAL 3 TIMES DAILY
Qty: 30 TABLET | Refills: 0 | Status: SHIPPED | OUTPATIENT
Start: 2017-11-16 | End: 2017-11-26

## 2017-11-16 RX ORDER — MORPHINE SULFATE 10 MG/ML
4 INJECTION INTRAMUSCULAR; INTRAVENOUS; SUBCUTANEOUS
Status: COMPLETED | OUTPATIENT
Start: 2017-11-16 | End: 2017-11-16

## 2017-11-16 RX ORDER — ONDANSETRON 2 MG/ML
4 INJECTION INTRAMUSCULAR; INTRAVENOUS
Status: COMPLETED | OUTPATIENT
Start: 2017-11-16 | End: 2017-11-16

## 2017-11-16 RX ORDER — CIPROFLOXACIN 500 MG/1
500 TABLET ORAL
Status: COMPLETED | OUTPATIENT
Start: 2017-11-16 | End: 2017-11-16

## 2017-11-16 RX ADMIN — CIPROFLOXACIN HYDROCHLORIDE 500 MG: 500 TABLET, FILM COATED ORAL at 08:11

## 2017-11-16 RX ADMIN — MORPHINE SULFATE 4 MG: 10 INJECTION, SOLUTION INTRAMUSCULAR; INTRAVENOUS at 04:11

## 2017-11-16 RX ADMIN — SODIUM CHLORIDE 1000 ML: 0.9 INJECTION, SOLUTION INTRAVENOUS at 04:11

## 2017-11-16 RX ADMIN — ONDANSETRON 4 MG: 2 INJECTION INTRAMUSCULAR; INTRAVENOUS at 04:11

## 2017-11-16 RX ADMIN — METRONIDAZOLE 500 MG: 500 TABLET ORAL at 08:11

## 2017-11-16 RX ADMIN — MORPHINE SULFATE 2 MG: 10 INJECTION INTRAMUSCULAR; INTRAVENOUS; SUBCUTANEOUS at 07:11

## 2017-11-16 NOTE — ED PROVIDER NOTES
SCRIBE #1 NOTE: I, Naya Salmeron, am scribing for, and in the presence of, Marimar Mac MD. I have scribed the entire note.      History      Chief Complaint   Patient presents with    Abdominal Pain     pt reports epigastric pain since this morning with nausea       Review of patient's allergies indicates:   Allergen Reactions    Tramadol         HPI   HPI    11/16/2017, 3:56 PM   History obtained from the patient      History of Present Illness: Tyson Ricks is a 32 y.o. female patient who presents to the Emergency Department for epigastric abd pain which onset suddenly this AM. Symptoms are constant and moderate in severity. No mitigating or exacerbating factors reported. Associated sxs include nausea. Patient denies any fever, chills, emesis, diarrhea, constipation, hematochezia, hematuria, dysuria, frequency, SOB, CP, and all other sxs at this time. No further complaints or concerns at this time.       Arrival mode: Personal vehicle      PCP: Primary Doctor No       Past Medical History:  Past Medical History:   Diagnosis Date    Anemia     Miscarriage     x2    Twins        Past Surgical History:  Past Surgical History:   Procedure Laterality Date    TONSILLECTOMY, ADENOIDECTOMY           Family History:  History reviewed. No pertinent family history.    Social History:  Social History     Social History Main Topics    Smoking status: Current Every Day Smoker     Packs/day: 0.50    Smokeless tobacco: Never Used    Alcohol use No    Drug use:      Types: Marijuana    Sexual activity: Yes     Partners: Male       ROS   Review of Systems   Constitutional: Negative for chills and fever.   HENT: Negative for sore throat.    Respiratory: Negative for shortness of breath.    Cardiovascular: Negative for chest pain.   Gastrointestinal: Positive for abdominal pain (epigastric) and nausea. Negative for blood in stool, constipation, diarrhea and vomiting.   Genitourinary: Negative for dysuria,  "frequency and hematuria.   Musculoskeletal: Negative for back pain.   Skin: Negative for rash.   Neurological: Negative for weakness.   Hematological: Does not bruise/bleed easily.   All other systems reviewed and are negative.      Physical Exam      Initial Vitals [11/16/17 1526]   BP Pulse Resp Temp SpO2   135/79 88 18 97.7 °F (36.5 °C) 100 %      MAP       97.67          Physical Exam  Nursing Notes and Vital Signs Reviewed.  Constitutional: Patient is in moderate distress. Well-developed and well-nourished.  Head: Atraumatic. Normocephalic.  Eyes: PERRL. EOM intact. Conjunctivae are not pale. No scleral icterus.  ENT: Mucous membranes are moist. Oropharynx is clear and symmetric.    Neck: Supple. Full ROM. No lymphadenopathy.  Cardiovascular: Regular rate. Regular rhythm. No murmurs, rubs, or gallops. Distal pulses are 2+ and symmetric.  Pulmonary/Chest: No respiratory distress. Clear to auscultation bilaterally. No wheezing or rales.  Abdominal: Soft and non-distended.  There is tenderness across upper abdomen, worse in the RUQ. Palpation of lower abdomen reproduces pain. No rebound or rigidity. Good bowel sounds.  Genitourinary: No CVA tenderness  Musculoskeletal: Moves all extremities. No obvious deformities. No edema. No calf tenderness.  Skin: Warm and dry.  Neurological:  Alert, awake, and appropriate.  Normal speech.  No acute focal neurological deficits are appreciated.  Psychiatric: Normal affect. Good eye contact. Appropriate in content.    ED Course    Procedures  ED Vital Signs:  Vitals:    11/16/17 1526 11/16/17 1630 11/16/17 1713 11/16/17 1843   BP: 135/79  122/71 131/77   Pulse: 88 88 (!) 57 (!) 57   Resp: 18 18 18   Temp: 97.7 °F (36.5 °C)      TempSrc: Oral      SpO2: 100%  98% 100%   Weight: 81.6 kg (180 lb)      Height: 5' 6" (1.676 m)       11/16/17 1912 11/16/17 1942 11/16/17 2012 11/16/17 2042   BP: 119/62 113/69 115/83 (!) 148/71   Pulse: 80 74 88 82   Resp: 16 16 18 20   Temp:     "   TempSrc:       SpO2: 100% 99% 99% 99%   Weight:       Height:           Abnormal Lab Results:  Labs Reviewed   CBC W/ AUTO DIFFERENTIAL - Abnormal; Notable for the following:        Result Value    WBC 13.62 (*)     MCV 79 (*)     MCH 26.1 (*)     RDW 14.9 (*)     Platelets 401 (*)     Gran # 10.4 (*)     Gran% 76.5 (*)     Lymph% 16.5 (*)     All other components within normal limits   COMPREHENSIVE METABOLIC PANEL - Abnormal; Notable for the following:     Sodium 133 (*)     Potassium 7.5 (*)     CO2 17 (*)     Total Protein 10.1 (*)     All other components within normal limits    Narrative:      K  critical result(s) called and verbal readback obtained from Fannie Reeder RN, 11/16/2017 17:28   URINALYSIS - Abnormal; Notable for the following:     pH, UA >8.0 (*)     Protein, UA Trace (*)     Ketones, UA 1+ (*)     All other components within normal limits   LIPASE   PREGNANCY TEST, URINE RAPID   POTASSIUM        All Lab Results:  Results for orders placed or performed during the hospital encounter of 11/16/17   Lipase   Result Value Ref Range    Lipase 23 4 - 60 U/L   CBC auto differential   Result Value Ref Range    WBC 13.62 (H) 3.90 - 12.70 K/uL    RBC 4.91 4.00 - 5.40 M/uL    Hemoglobin 12.8 12.0 - 16.0 g/dL    Hematocrit 38.6 37.0 - 48.5 %    MCV 79 (L) 82 - 98 fL    MCH 26.1 (L) 27.0 - 31.0 pg    MCHC 33.2 32.0 - 36.0 g/dL    RDW 14.9 (H) 11.5 - 14.5 %    Platelets 401 (H) 150 - 350 K/uL    MPV 9.3 9.2 - 12.9 fL    Gran # 10.4 (H) 1.8 - 7.7 K/uL    Lymph # 2.3 1.0 - 4.8 K/uL    Mono # 0.9 0.3 - 1.0 K/uL    Eos # 0.0 0.0 - 0.5 K/uL    Baso # 0.02 0.00 - 0.20 K/uL    Gran% 76.5 (H) 38.0 - 73.0 %    Lymph% 16.5 (L) 18.0 - 48.0 %    Mono% 6.8 4.0 - 15.0 %    Eosinophil% 0.1 0.0 - 8.0 %    Basophil% 0.1 0.0 - 1.9 %    Differential Method Automated    Comprehensive metabolic panel   Result Value Ref Range    Sodium 133 (L) 136 - 145 mmol/L    Potassium 7.5 (HH) 3.5 - 5.1 mmol/L    Chloride 104 95 - 110  mmol/L    CO2 17 (L) 23 - 29 mmol/L    Glucose 107 70 - 110 mg/dL    BUN, Bld 14 6 - 20 mg/dL    Creatinine 0.9 0.5 - 1.4 mg/dL    Calcium 10.0 8.7 - 10.5 mg/dL    Total Protein 10.1 (H) 6.0 - 8.4 g/dL    Albumin 4.2 3.5 - 5.2 g/dL    Total Bilirubin 0.5 0.1 - 1.0 mg/dL    Alkaline Phosphatase 81 55 - 135 U/L    AST 34 10 - 40 U/L    ALT 18 10 - 44 U/L    Anion Gap 12 8 - 16 mmol/L    eGFR if African American >60 >60 mL/min/1.73 m^2    eGFR if non African American >60 >60 mL/min/1.73 m^2   Urinalysis   Result Value Ref Range    Specimen UA Urine, Clean Catch     Color, UA Yellow Yellow, Straw, Lizbeth    Appearance, UA Clear Clear    pH, UA >8.0 (A) 5.0 - 8.0    Specific Gravity, UA 1.010 1.005 - 1.030    Protein, UA Trace (A) Negative    Glucose, UA Negative Negative    Ketones, UA 1+ (A) Negative    Bilirubin (UA) Negative Negative    Occult Blood UA Negative Negative    Nitrite, UA Negative Negative    Urobilinogen, UA Negative <2.0 EU/dL    Leukocytes, UA Negative Negative   Pregnancy, urine rapid   Result Value Ref Range    Preg Test, Ur Negative    Potassium   Result Value Ref Range    Potassium 3.7 3.5 - 5.1 mmol/L       Imaging Results:  Imaging Results          US Abdomen Limited (Final result)  Result time 11/16/17 17:23:10    Final result by Wesley Hernandez MD (11/16/17 17:23:10)                 Impression:     Cholelithiasis.  Mild wall thickening and a positive Hansen sign.  Correlate with signs and symptoms of acute cholecystitis.  No biliary ductal dilatation.      Electronically signed by: WESLEY HERNANDEZ MD  Date:     11/16/17  Time:    17:23              Narrative:    Exam: US ABDOMEN LIMITED    Clinical History: Acute onset right upper quadrant pain.  Initial encounter.      Findings:     The liver size and echotexture is normal. No focal liver lesion identified. Multiple echogenic shadowing gallstones present.  Positive sonographic Hansen's sign.  Mild gallbladder wall thickening.  No pericholecystic  fluid. The common duct measures 5 mm. The right kidney measures 10.5cm in long axis and has a normal sonographic appearance. Visualized pancreas and inferior vena cava appear normal. No free fluid in the upper abdomen. Hepatopedal flow noted in the portal vein.                                      The Emergency Provider reviewed the vital signs and test results, which are outlined above.    ED Discussion     6:51 PM: Re-evaluated pt. Pt is resting comfortably and is in no acute distress. Repeat Abdominal Exam: Pt still has RUQ tenderness with guarding. Pt states her pain has improved after treatment with Morphine. D/w pt all pertinent results. D/w pt any concerns expressed at this time. Answered all questions. Pt expresses understanding at this time.    7:31 PM: Discussed the pt's case with Dr. Jeansonne (General Surgery) who does not believe pt meets criteria for emergent surgical intervention at this time. Dr. Jeansonne recommends the pt call his clinic in the AM and f/u outpatient. Dr. Jeansonne states he will contact his clinic and make sure pt is able to get an appointment. He also recommends starting pt on Cipro and Flagyl.     7:40 PM: Reassessed pt at this time.  Pt states her condition has improved at this time. Discussed with pt all pertinent ED information and results. Informed pt of Dr. Jeansonne's recommendations. Discussed pt dx and plan of tx. Gave pt all f/u and return to the ED instructions. All questions and concerns were addressed at this time. Pt expresses understanding of information and instructions, and is comfortable with plan to discharge. Pt is stable for discharge.    I discussed with patient and/or family/caretaker that evaluation in the ED does not suggest any emergent or life threatening medical conditions requiring immediate intervention beyond what was provided in the ED, and I believe patient is safe for discharge.  Regardless, an unremarkable evaluation in the ED does not preclude  the development or presence of a serious of life threatening condition. As such, patient was instructed to return immediately for any worsening or change in current symptoms.      ED Medication(s):  Medications   sodium chloride 0.9% bolus 1,000 mL (0 mLs Intravenous Stopped 11/16/17 1941)   ondansetron injection 4 mg (4 mg Intravenous Given 11/16/17 1650)   morphine injection 4 mg (4 mg Intravenous Given 11/16/17 1650)   morphine injection 2 mg (2 mg Intravenous Given 11/16/17 1915)   ciprofloxacin HCl tablet 500 mg (500 mg Oral Given 11/16/17 2024)   metroNIDAZOLE tablet 500 mg (500 mg Oral Given 11/16/17 2024)       Discharge Medication List as of 11/16/2017  8:14 PM      START taking these medications    Details   ciprofloxacin HCl (CIPRO) 500 MG tablet Take 1 tablet (500 mg total) by mouth 2 (two) times daily., Starting Thu 11/16/2017, Until [place] 11/26/2017, Print      hydrocodone-acetaminophen 7.5-325mg (NORCO) 7.5-325 mg per tablet Take 1 tablet by mouth every 8 (eight) hours as needed for Pain., Starting Thu 11/16/2017, Print      metroNIDAZOLE (FLAGYL) 500 MG tablet Take 1 tablet (500 mg total) by mouth 3 (three) times daily., Starting Thu 11/16/2017, Until [place] 11/26/2017, Print      promethazine (PHENERGAN) 25 MG tablet Take 1 tablet (25 mg total) by mouth every 6 (six) hours as needed for Nausea., Starting Thu 11/16/2017, Print             Follow-up Information     Louis O. Jeansonne, MD. Call in 1 day.    Specialties:  General Surgery, Surgery  Why:  Call Dr. Jeansonne's office in AM to schedule appointment for follow up.  Can return to the ER, If symptoms worsen  Contact information:  [address] [place] [address] DR Lonnie BOTELLO [id number] [phone]                     Medical Decision Making    Medical Decision Making:   Clinical Tests:   Lab Tests: Reviewed and Ordered  Radiological Study: Ordered and Reviewed           Scribe Attestation:   Scribe #1: I performed the above scribed service and the  documentation accurately describes the services I performed. I attest to the accuracy of the note.    Attending:   Physician Attestation Statement for Scribe #1: I, Marimar Mac MD, personally performed the services described in this documentation, as scribed by Naya Salmeron, in my presence, and it is both accurate and complete.          Clinical Impression       ICD-10-CM ICD-9-CM   1. Biliary colic K80.50 574.20   2. RUQ abdominal pain R10.11 789.01       Disposition:   Disposition: Discharged  Condition: Stable         Marimar Mac MD  11/17/17 0132

## 2017-11-17 ENCOUNTER — TELEPHONE (OUTPATIENT)
Dept: SURGERY | Facility: CLINIC | Age: 32
End: 2017-11-17

## 2017-11-17 NOTE — ED NOTES
Pt asleep - awakened for assessment.  Pt IV fluids completed. Dr Mac awaiting consult with Dr Jeansonne with surgery.

## 2017-11-17 NOTE — TELEPHONE ENCOUNTER
----- Message from Louis O. Jeansonne IV, MD sent at 11/16/2017  7:33 PM CST -----   Please try to work this patient in for next available clinic spot with me.

## 2017-11-17 NOTE — TELEPHONE ENCOUNTER
Called patient per Dr Jeansonne to schedule an appointment. Someone answered the phone but hung up. Repeated calls were not answered. Left message

## 2018-04-06 ENCOUNTER — HOSPITAL ENCOUNTER (EMERGENCY)
Facility: HOSPITAL | Age: 33
Discharge: HOME | End: 2018-04-06
Attending: EMERGENCY MEDICINE
Payer: MEDICAID

## 2018-04-06 VITALS
WEIGHT: 240.5 LBS | TEMPERATURE: 98 F | BODY MASS INDEX: 38.65 KG/M2 | DIASTOLIC BLOOD PRESSURE: 87 MMHG | HEIGHT: 66 IN | OXYGEN SATURATION: 100 % | HEART RATE: 79 BPM | RESPIRATION RATE: 17 BRPM | SYSTOLIC BLOOD PRESSURE: 131 MMHG

## 2018-04-06 DIAGNOSIS — R10.11 RIGHT UPPER QUADRANT ABDOMINAL PAIN: Primary | ICD-10-CM

## 2018-04-06 LAB
ALBUMIN SERPL BCP-MCNC: 3.7 G/DL
ALP SERPL-CCNC: 69 U/L
ALT SERPL W/O P-5'-P-CCNC: 15 U/L
ANION GAP SERPL CALC-SCNC: 10 MMOL/L
AST SERPL-CCNC: 12 U/L
B-HCG UR QL: NEGATIVE
BASOPHILS # BLD AUTO: 0.02 K/UL
BASOPHILS NFR BLD: 0.2 %
BILIRUB SERPL-MCNC: 0.1 MG/DL
BILIRUB UR QL STRIP: NEGATIVE
BUN SERPL-MCNC: 18 MG/DL
CALCIUM SERPL-MCNC: 8.8 MG/DL
CHLORIDE SERPL-SCNC: 103 MMOL/L
CLARITY UR: CLEAR
CO2 SERPL-SCNC: 23 MMOL/L
COLOR UR: YELLOW
CREAT SERPL-MCNC: 0.8 MG/DL
DIFFERENTIAL METHOD: ABNORMAL
EOSINOPHIL # BLD AUTO: 0.2 K/UL
EOSINOPHIL NFR BLD: 1.6 %
ERYTHROCYTE [DISTWIDTH] IN BLOOD BY AUTOMATED COUNT: 15.5 %
EST. GFR  (AFRICAN AMERICAN): >60 ML/MIN/1.73 M^2
EST. GFR  (NON AFRICAN AMERICAN): >60 ML/MIN/1.73 M^2
GLUCOSE SERPL-MCNC: 95 MG/DL
GLUCOSE UR QL STRIP: NEGATIVE
HCT VFR BLD AUTO: 34.5 %
HGB BLD-MCNC: 10.9 G/DL
HGB UR QL STRIP: NEGATIVE
KETONES UR QL STRIP: NEGATIVE
LEUKOCYTE ESTERASE UR QL STRIP: NEGATIVE
LIPASE SERPL-CCNC: 54 U/L
LYMPHOCYTES # BLD AUTO: 4.4 K/UL
LYMPHOCYTES NFR BLD: 35.9 %
MCH RBC QN AUTO: 25.5 PG
MCHC RBC AUTO-ENTMCNC: 31.6 G/DL
MCV RBC AUTO: 81 FL
MONOCYTES # BLD AUTO: 0.9 K/UL
MONOCYTES NFR BLD: 7.3 %
NEUTROPHILS # BLD AUTO: 6.8 K/UL
NEUTROPHILS NFR BLD: 55 %
NITRITE UR QL STRIP: NEGATIVE
PH UR STRIP: 7 [PH] (ref 5–8)
PLATELET # BLD AUTO: 399 K/UL
PMV BLD AUTO: 9.1 FL
POTASSIUM SERPL-SCNC: 3.6 MMOL/L
PROT SERPL-MCNC: 7.4 G/DL
PROT UR QL STRIP: NEGATIVE
RBC # BLD AUTO: 4.27 M/UL
SODIUM SERPL-SCNC: 136 MMOL/L
SP GR UR STRIP: 1.02 (ref 1–1.03)
URN SPEC COLLECT METH UR: NORMAL
UROBILINOGEN UR STRIP-ACNC: NEGATIVE EU/DL
WBC # BLD AUTO: 12.32 K/UL

## 2018-04-06 PROCEDURE — 99284 EMERGENCY DEPT VISIT MOD MDM: CPT | Mod: 25

## 2018-04-06 PROCEDURE — 83690 ASSAY OF LIPASE: CPT

## 2018-04-06 PROCEDURE — 81003 URINALYSIS AUTO W/O SCOPE: CPT

## 2018-04-06 PROCEDURE — 96374 THER/PROPH/DIAG INJ IV PUSH: CPT

## 2018-04-06 PROCEDURE — 63600175 PHARM REV CODE 636 W HCPCS: Performed by: EMERGENCY MEDICINE

## 2018-04-06 PROCEDURE — 80053 COMPREHEN METABOLIC PANEL: CPT

## 2018-04-06 PROCEDURE — 81025 URINE PREGNANCY TEST: CPT

## 2018-04-06 PROCEDURE — 85025 COMPLETE CBC W/AUTO DIFF WBC: CPT

## 2018-04-06 PROCEDURE — 96375 TX/PRO/DX INJ NEW DRUG ADDON: CPT

## 2018-04-06 RX ORDER — MEPERIDINE HYDROCHLORIDE 50 MG/ML
25 INJECTION INTRAMUSCULAR; INTRAVENOUS; SUBCUTANEOUS
Status: COMPLETED | OUTPATIENT
Start: 2018-04-06 | End: 2018-04-06

## 2018-04-06 RX ORDER — ONDANSETRON 2 MG/ML
4 INJECTION INTRAMUSCULAR; INTRAVENOUS
Status: COMPLETED | OUTPATIENT
Start: 2018-04-06 | End: 2018-04-06

## 2018-04-06 RX ADMIN — MEPERIDINE HYDROCHLORIDE: 50 INJECTION INTRAMUSCULAR; INTRAVENOUS; SUBCUTANEOUS at 05:04

## 2018-04-06 RX ADMIN — ONDANSETRON 4 MG: 2 INJECTION INTRAMUSCULAR; INTRAVENOUS at 05:04

## 2018-04-06 NOTE — ED PROVIDER NOTES
SCRIBE #1 NOTE: I, Naya Salmeron, am scribing for, and in the presence of, Husam West MD. I have scribed the HPI, ROS, and PEx.     SCRIBE #2 NOTE: I, Jong Salcedo, am scribing for, and in the presence of,  Nathaniel Rice MD. I have scribed the remaining portions of the note not scribed by Scribe #1.     History      Chief Complaint   Patient presents with    Flank Pain     R side, started suddenly at 0400 today       Review of patient's allergies indicates:   Allergen Reactions    Tramadol         HPI   HPI    4/6/2018, 5:16 AM   History obtained from the patient      History of Present Illness: Tyson Ricks is a 32 y.o. female patient who presents to the Emergency Department for R flank pain which onset suddenly at 0400 this AM waking pt up from sleep. Symptoms are constant and moderate in severity. No mitigating or exacerbating factors reported. Associated sxs include RUQ abd pain. Patient denies any fever, chills, n/v/d, constipation, hematochezia, hematuria, dysuria, frequency, vaginal bleeding/discharge, and all other sxs at this time. Pt states she diagnosed with cholelithiasis in November 2016. No further complaints or concerns at this time.       Arrival mode: Personal vehicle      PCP: Primary Doctor No       Past Medical History:  Past Medical History:   Diagnosis Date    Anemia     Miscarriage     x2    Twins        Past Surgical History:  Past Surgical History:   Procedure Laterality Date    TONSILLECTOMY, ADENOIDECTOMY           Family History:  Hx reviewed, not pertinent    Social History:  Social History     Social History Main Topics    Smoking status: Current Every Day Smoker     Packs/day: 0.50    Smokeless tobacco: Never Used    Alcohol use No    Drug use: Yes     Types: Marijuana    Sexual activity: Yes     Partners: Male       ROS   Review of Systems   Constitutional: Negative for chills and fever.   HENT: Negative for sore throat.    Respiratory: Negative for shortness of  "breath.    Cardiovascular: Negative for chest pain.   Gastrointestinal: Positive for abdominal pain (RUQ). Negative for blood in stool, constipation, diarrhea, nausea and vomiting.   Genitourinary: Positive for flank pain (R). Negative for dysuria, frequency, hematuria, vaginal bleeding and vaginal discharge.   Musculoskeletal: Negative for back pain.   Skin: Negative for rash.   Neurological: Negative for weakness.   Hematological: Does not bruise/bleed easily.   All other systems reviewed and are negative.    Physical Exam      Initial Vitals [04/06/18 0513]   BP Pulse Resp Temp SpO2   (!) 146/75 79 20 97.9 °F (36.6 °C) 100 %      MAP       98.67          Physical Exam  Nursing Notes and Vital Signs Reviewed.  Constitutional: Patient is in mild distress. Well-developed and well-nourished.  Head: Atraumatic. Normocephalic.  Eyes: PERRL. EOM intact. Conjunctivae are not pale. No scleral icterus.  ENT: Mucous membranes are moist. Oropharynx is clear and symmetric.    Neck: Supple. Full ROM. No lymphadenopathy.  Cardiovascular: Regular rate. Regular rhythm. No murmurs, rubs, or gallops. Distal pulses are 2+ and symmetric.  Pulmonary/Chest: No respiratory distress. Clear to auscultation bilaterally. No wheezing or rales.  Abdominal: Soft and non-distended.  There is RUQ tenderness.  No rebound, guarding, or rigidity.  Genitourinary: No CVA tenderness  Musculoskeletal: Moves all extremities. No obvious deformities. No edema. No calf tenderness.  Skin: Warm and dry.  Neurological:  Alert, awake, and appropriate.  Normal speech.  No acute focal neurological deficits are appreciated.  Psychiatric: Normal affect. Good eye contact. Appropriate in content.    ED Course    Procedures  ED Vital Signs:  Vitals:    04/06/18 0513 04/06/18 0700   BP: (!) 146/75 131/87   Pulse: 79 79   Resp: 20 17   Temp: 97.9 °F (36.6 °C)    TempSrc: Oral    SpO2: 100% 100%   Weight: 109.1 kg (240 lb 8.4 oz)    Height: 5' 6" (1.676 m)  "       Abnormal Lab Results:  Labs Reviewed   CBC W/ AUTO DIFFERENTIAL - Abnormal; Notable for the following:        Result Value    Hemoglobin 10.9 (*)     Hematocrit 34.5 (*)     MCV 81 (*)     MCH 25.5 (*)     MCHC 31.6 (*)     RDW 15.5 (*)     Platelets 399 (*)     MPV 9.1 (*)     All other components within normal limits   COMPREHENSIVE METABOLIC PANEL   LIPASE   URINALYSIS   PREGNANCY TEST, URINE RAPID        All Lab Results:  Results for orders placed or performed during the hospital encounter of 04/06/18   CBC W/ AUTO DIFFERENTIAL   Result Value Ref Range    WBC 12.32 3.90 - 12.70 K/uL    RBC 4.27 4.00 - 5.40 M/uL    Hemoglobin 10.9 (L) 12.0 - 16.0 g/dL    Hematocrit 34.5 (L) 37.0 - 48.5 %    MCV 81 (L) 82 - 98 fL    MCH 25.5 (L) 27.0 - 31.0 pg    MCHC 31.6 (L) 32.0 - 36.0 g/dL    RDW 15.5 (H) 11.5 - 14.5 %    Platelets 399 (H) 150 - 350 K/uL    MPV 9.1 (L) 9.2 - 12.9 fL    Gran # (ANC) 6.8 1.8 - 7.7 K/uL    Lymph # 4.4 1.0 - 4.8 K/uL    Mono # 0.9 0.3 - 1.0 K/uL    Eos # 0.2 0.0 - 0.5 K/uL    Baso # 0.02 0.00 - 0.20 K/uL    Gran% 55.0 38.0 - 73.0 %    Lymph% 35.9 18.0 - 48.0 %    Mono% 7.3 4.0 - 15.0 %    Eosinophil% 1.6 0.0 - 8.0 %    Basophil% 0.2 0.0 - 1.9 %    Differential Method Automated    Comp. Metabolic Panel   Result Value Ref Range    Sodium 136 136 - 145 mmol/L    Potassium 3.6 3.5 - 5.1 mmol/L    Chloride 103 95 - 110 mmol/L    CO2 23 23 - 29 mmol/L    Glucose 95 70 - 110 mg/dL    BUN, Bld 18 6 - 20 mg/dL    Creatinine 0.8 0.5 - 1.4 mg/dL    Calcium 8.8 8.7 - 10.5 mg/dL    Total Protein 7.4 6.0 - 8.4 g/dL    Albumin 3.7 3.5 - 5.2 g/dL    Total Bilirubin 0.1 0.1 - 1.0 mg/dL    Alkaline Phosphatase 69 55 - 135 U/L    AST 12 10 - 40 U/L    ALT 15 10 - 44 U/L    Anion Gap 10 8 - 16 mmol/L    eGFR if African American >60 >60 mL/min/1.73 m^2    eGFR if non African American >60 >60 mL/min/1.73 m^2   Lipase   Result Value Ref Range    Lipase 54 4 - 60 U/L   Urinalysis - Clean Catch   Result Value Ref  "Range    Specimen UA Urine, Clean Catch     Color, UA Yellow Yellow, Straw, Lizbeth    Appearance, UA Clear Clear    pH, UA 7.0 5.0 - 8.0    Specific Gravity, UA 1.025 1.005 - 1.030    Protein, UA Negative Negative    Glucose, UA Negative Negative    Ketones, UA Negative Negative    Bilirubin (UA) Negative Negative    Occult Blood UA Negative Negative    Nitrite, UA Negative Negative    Urobilinogen, UA Negative <2.0 EU/dL    Leukocytes, UA Negative Negative   Pregnancy, urine rapid   Result Value Ref Range    Preg Test, Ur Negative               The Emergency Provider reviewed the vital signs and test results, which are outlined above.    ED Discussion     6:00 AM: Dr. West transfers care of pt to Dr. Rice, pending lab results.    7:27 AM: Reassessed pt at this time. Re-examined pt's abd: No tenderness, rebound, or guarding appreciated. Pt reports that she missed an appointment w/ general surgery "a while ago." If the sxs continue or worsen, instructed patient to contact general surgery as an outpatient. Discussed with pt all pertinent ED information and results. Discussed pt dx and plan of tx. Gave pt all f/u and return to the ED instructions. All questions and concerns were addressed at this time. Pt expresses understanding of information and instructions, and is comfortable with plan to discharge. Pt is stable for discharge.    I discussed with patient and/or family/caretaker that evaluation in the ED does not suggest any emergent or life threatening medical conditions requiring immediate intervention beyond what was provided in the ED, and I believe patient is safe for discharge.  Regardless, an unremarkable evaluation in the ED does not preclude the development or presence of a serious of life threatening condition. As such, patient was instructed to return immediately for any worsening or change in current symptoms.      ED Medication(s):  Medications   ondansetron injection 4 mg (4 mg Intravenous Given " 4/6/18 0546)   meperidine injection 25 mg ( Intravenous Given 4/6/18 0547)       Discharge Medication List as of 4/6/2018  7:18 AM          Follow-up Information     Louis O. Jeansonne, MD. Call in 2 days.    Specialties:  General Surgery, Surgery  Contact information:  [address] [place] [address] DR Lonnie BOTELLO [id number]  [phone]                     Medical Decision Making    Medical Decision Making:   Clinical Tests:   Lab Tests: Ordered and Reviewed           Scribe Attestation:   Scribe #1: I performed the above scribed service and the documentation accurately describes the services I performed. I attest to the accuracy of the note.    Attending:   Physician Attestation Statement for Scribe #1: I, Husam West MD, personally performed the services described in this documentation, as scribed by Naya Salmeron, in my presence, and it is both accurate and complete.       Scribe Attestation:   Scribe #2: I performed the above scribed service and the documentation accurately describes the services I performed. I attest to the accuracy of the note.    Attending Attestation:           Physician Attestation for Scribe:    Physician Attestation Statement for Scribe #2: I, Nathaniel Rice MD, reviewed documentation, as scribed by Jong aSlcedo in my presence, and it is both accurate and complete. I also acknowledge and confirm the content of the note done by Scribe #1.          Clinical Impression       ICD-10-CM ICD-9-CM   1. Right upper quadrant abdominal pain R10.11 789.01       Disposition:   Disposition: Discharged  Condition: Stable         Nathaniel Rice Jr., MD  04/06/18 2372